# Patient Record
Sex: MALE | Race: WHITE | NOT HISPANIC OR LATINO | Employment: OTHER | URBAN - METROPOLITAN AREA
[De-identification: names, ages, dates, MRNs, and addresses within clinical notes are randomized per-mention and may not be internally consistent; named-entity substitution may affect disease eponyms.]

---

## 2019-01-24 PROBLEM — E78.00 PURE HYPERCHOLESTEROLEMIA: Status: ACTIVE | Noted: 2019-01-24

## 2019-01-24 PROBLEM — Z72.0 TOBACCO ABUSE: Status: ACTIVE | Noted: 2019-01-24

## 2019-01-24 PROBLEM — I10 ESSENTIAL HYPERTENSION: Status: ACTIVE | Noted: 2019-01-24

## 2019-06-11 ENCOUNTER — PATIENT OUTREACH (OUTPATIENT)
Dept: ADMINISTRATIVE | Facility: HOSPITAL | Age: 59
End: 2019-06-11

## 2019-07-09 PROBLEM — Z86.010 HISTORY OF COLON POLYPS: Status: ACTIVE | Noted: 2019-07-09

## 2019-07-09 PROBLEM — Z86.0100 HISTORY OF COLON POLYPS: Status: ACTIVE | Noted: 2019-07-09

## 2020-01-28 PROBLEM — E11.9 CONTROLLED TYPE 2 DIABETES MELLITUS WITHOUT COMPLICATION, WITHOUT LONG-TERM CURRENT USE OF INSULIN: Status: ACTIVE | Noted: 2020-01-28

## 2020-07-16 ENCOUNTER — PATIENT OUTREACH (OUTPATIENT)
Dept: ADMINISTRATIVE | Facility: HOSPITAL | Age: 60
End: 2020-07-16

## 2020-07-16 ENCOUNTER — TELEPHONE (OUTPATIENT)
Dept: ADMINISTRATIVE | Facility: HOSPITAL | Age: 60
End: 2020-07-16

## 2021-07-29 PROBLEM — I25.10 CORONARY ARTERY CALCIFICATION: Status: ACTIVE | Noted: 2021-07-29

## 2021-07-29 PROBLEM — I25.84 CORONARY ARTERY CALCIFICATION: Status: ACTIVE | Noted: 2021-07-29

## 2021-07-29 PROBLEM — E78.2 MIXED HYPERLIPIDEMIA: Status: ACTIVE | Noted: 2021-07-29

## 2021-07-29 PROBLEM — I71.40 ABDOMINAL AORTIC ANEURYSM (AAA) WITHOUT RUPTURE: Status: ACTIVE | Noted: 2021-07-29

## 2022-06-10 ENCOUNTER — PATIENT OUTREACH (OUTPATIENT)
Dept: ADMINISTRATIVE | Facility: HOSPITAL | Age: 62
End: 2022-06-10

## 2022-06-10 NOTE — PROGRESS NOTES
Attempted to contact pt in reference to [facility] gap report. Unable to contact. No answer message left.

## 2022-07-23 PROBLEM — I21.4 NSTEMI (NON-ST ELEVATED MYOCARDIAL INFARCTION): Status: ACTIVE | Noted: 2022-07-23

## 2022-07-26 RX ORDER — DICLOFENAC SODIUM 10 MG/G
2 GEL TOPICAL 4 TIMES DAILY
COMMUNITY
Start: 2022-06-27 | End: 2022-08-12 | Stop reason: ALTCHOICE

## 2022-07-27 ENCOUNTER — TELEPHONE (OUTPATIENT)
Dept: CARDIAC REHAB | Facility: CLINIC | Age: 62
End: 2022-07-27
Payer: MEDICAID

## 2022-07-27 ENCOUNTER — HOSPITAL ENCOUNTER (INPATIENT)
Facility: HOSPITAL | Age: 62
LOS: 1 days | Discharge: HOME | DRG: 247 | End: 2022-07-28
Attending: HOSPITALIST | Admitting: HOSPITALIST
Payer: MEDICAID

## 2022-07-27 DIAGNOSIS — E11.9 CONTROLLED TYPE 2 DIABETES MELLITUS WITHOUT COMPLICATION, WITHOUT LONG-TERM CURRENT USE OF INSULIN: ICD-10-CM

## 2022-07-27 DIAGNOSIS — E78.2 MIXED HYPERLIPIDEMIA: ICD-10-CM

## 2022-07-27 DIAGNOSIS — I21.4 ACUTE MYOCARDIAL INFARCTION, SUBENDOCARDIAL INFARCTION, INITIAL EPISODE OF CARE: Primary | ICD-10-CM

## 2022-07-27 DIAGNOSIS — R07.9 CHEST PAIN: ICD-10-CM

## 2022-07-27 DIAGNOSIS — I21.4 NSTEMI (NON-ST ELEVATED MYOCARDIAL INFARCTION): Primary | ICD-10-CM

## 2022-07-27 DIAGNOSIS — Z95.5 STATUS POST CORONARY ARTERY STENT PLACEMENT: ICD-10-CM

## 2022-07-27 DIAGNOSIS — R07.9 CHEST PAIN AT REST: ICD-10-CM

## 2022-07-27 DIAGNOSIS — Z72.0 TOBACCO ABUSE: ICD-10-CM

## 2022-07-27 DIAGNOSIS — Z98.62 STATUS POST ANGIOPLASTY: ICD-10-CM

## 2022-07-27 DIAGNOSIS — I25.10 CORONARY ARTERY CALCIFICATION: ICD-10-CM

## 2022-07-27 DIAGNOSIS — I71.40 ABDOMINAL AORTIC ANEURYSM (AAA) WITHOUT RUPTURE: ICD-10-CM

## 2022-07-27 DIAGNOSIS — I25.84 CORONARY ARTERY CALCIFICATION: ICD-10-CM

## 2022-07-27 LAB
ALBUMIN SERPL BCP-MCNC: 3.8 G/DL (ref 3.5–5.2)
ALP SERPL-CCNC: 96 U/L (ref 55–135)
ALT SERPL W/O P-5'-P-CCNC: 25 U/L (ref 10–44)
ANION GAP SERPL CALC-SCNC: 10 MMOL/L (ref 8–16)
APTT BLDCRRT: 29.3 SEC (ref 21–32)
APTT BLDCRRT: 31.7 SEC (ref 21–32)
APTT BLDCRRT: 66.9 SEC (ref 21–32)
AST SERPL-CCNC: 21 U/L (ref 10–40)
BASOPHILS # BLD AUTO: 0.04 K/UL (ref 0–0.2)
BASOPHILS # BLD AUTO: 0.05 K/UL (ref 0–0.2)
BASOPHILS NFR BLD: 0.7 % (ref 0–1.9)
BASOPHILS NFR BLD: 0.9 % (ref 0–1.9)
BILIRUB SERPL-MCNC: 0.4 MG/DL (ref 0.1–1)
BUN SERPL-MCNC: 14 MG/DL (ref 6–20)
CALCIUM SERPL-MCNC: 9.9 MG/DL (ref 8.7–10.5)
CHLORIDE SERPL-SCNC: 105 MMOL/L (ref 95–110)
CO2 SERPL-SCNC: 23 MMOL/L (ref 23–29)
CREAT SERPL-MCNC: 0.8 MG/DL (ref 0.5–1.4)
DIFFERENTIAL METHOD: ABNORMAL
DIFFERENTIAL METHOD: ABNORMAL
EOSINOPHIL # BLD AUTO: 0.1 K/UL (ref 0–0.5)
EOSINOPHIL # BLD AUTO: 0.1 K/UL (ref 0–0.5)
EOSINOPHIL NFR BLD: 1 % (ref 0–8)
EOSINOPHIL NFR BLD: 1.2 % (ref 0–8)
ERYTHROCYTE [DISTWIDTH] IN BLOOD BY AUTOMATED COUNT: 14.7 % (ref 11.5–14.5)
ERYTHROCYTE [DISTWIDTH] IN BLOOD BY AUTOMATED COUNT: 14.7 % (ref 11.5–14.5)
EST. GFR  (AFRICAN AMERICAN): >60 ML/MIN/1.73 M^2
EST. GFR  (NON AFRICAN AMERICAN): >60 ML/MIN/1.73 M^2
ESTIMATED AVG GLUCOSE: 131 MG/DL (ref 68–131)
GLUCOSE SERPL-MCNC: 92 MG/DL (ref 70–110)
HBA1C MFR BLD: 6.2 % (ref 4–5.6)
HCT VFR BLD AUTO: 50.4 % (ref 40–54)
HCT VFR BLD AUTO: 51 % (ref 40–54)
HGB BLD-MCNC: 16.5 G/DL (ref 14–18)
HGB BLD-MCNC: 17 G/DL (ref 14–18)
IMM GRANULOCYTES # BLD AUTO: 0.03 K/UL (ref 0–0.04)
IMM GRANULOCYTES # BLD AUTO: 0.03 K/UL (ref 0–0.04)
IMM GRANULOCYTES NFR BLD AUTO: 0.5 % (ref 0–0.5)
IMM GRANULOCYTES NFR BLD AUTO: 0.5 % (ref 0–0.5)
INR PPP: 1.1 (ref 0.8–1.2)
LYMPHOCYTES # BLD AUTO: 1.4 K/UL (ref 1–4.8)
LYMPHOCYTES # BLD AUTO: 1.4 K/UL (ref 1–4.8)
LYMPHOCYTES NFR BLD: 23.5 % (ref 18–48)
LYMPHOCYTES NFR BLD: 23.9 % (ref 18–48)
MAGNESIUM SERPL-MCNC: 2 MG/DL (ref 1.6–2.6)
MCH RBC QN AUTO: 29.7 PG (ref 27–31)
MCH RBC QN AUTO: 30.2 PG (ref 27–31)
MCHC RBC AUTO-ENTMCNC: 32.4 G/DL (ref 32–36)
MCHC RBC AUTO-ENTMCNC: 33.7 G/DL (ref 32–36)
MCV RBC AUTO: 90 FL (ref 82–98)
MCV RBC AUTO: 92 FL (ref 82–98)
MONOCYTES # BLD AUTO: 0.7 K/UL (ref 0.3–1)
MONOCYTES # BLD AUTO: 0.7 K/UL (ref 0.3–1)
MONOCYTES NFR BLD: 11.3 % (ref 4–15)
MONOCYTES NFR BLD: 12.4 % (ref 4–15)
NEUTROPHILS # BLD AUTO: 3.6 K/UL (ref 1.8–7.7)
NEUTROPHILS # BLD AUTO: 3.6 K/UL (ref 1.8–7.7)
NEUTROPHILS NFR BLD: 61.5 % (ref 38–73)
NEUTROPHILS NFR BLD: 62.6 % (ref 38–73)
NRBC BLD-RTO: 0 /100 WBC
NRBC BLD-RTO: 0 /100 WBC
PHOSPHATE SERPL-MCNC: 3.5 MG/DL (ref 2.7–4.5)
PLATELET # BLD AUTO: 211 K/UL (ref 150–450)
PLATELET # BLD AUTO: 214 K/UL (ref 150–450)
PMV BLD AUTO: 10.2 FL (ref 9.2–12.9)
PMV BLD AUTO: 9.9 FL (ref 9.2–12.9)
POC ACTIVATED CLOTTING TIME K: 126 SEC (ref 74–137)
POC ACTIVATED CLOTTING TIME K: 179 SEC (ref 74–137)
POC ACTIVATED CLOTTING TIME K: 213 SEC (ref 74–137)
POC ACTIVATED CLOTTING TIME K: 213 SEC (ref 74–137)
POC ACTIVATED CLOTTING TIME K: 242 SEC (ref 74–137)
POC ACTIVATED CLOTTING TIME K: 265 SEC (ref 74–137)
POC ACTIVATED CLOTTING TIME K: 289 SEC (ref 74–137)
POC ACTIVATED CLOTTING TIME K: 335 SEC (ref 74–137)
POCT GLUCOSE: 96 MG/DL (ref 70–110)
POCT GLUCOSE: 98 MG/DL (ref 70–110)
POTASSIUM SERPL-SCNC: 4.1 MMOL/L (ref 3.5–5.1)
PROT SERPL-MCNC: 7.9 G/DL (ref 6–8.4)
PROTHROMBIN TIME: 11.1 SEC (ref 9–12.5)
RBC # BLD AUTO: 5.55 M/UL (ref 4.6–6.2)
RBC # BLD AUTO: 5.63 M/UL (ref 4.6–6.2)
SAMPLE: ABNORMAL
SAMPLE: NORMAL
SODIUM SERPL-SCNC: 138 MMOL/L (ref 136–145)
WBC # BLD AUTO: 5.79 K/UL (ref 3.9–12.7)
WBC # BLD AUTO: 5.82 K/UL (ref 3.9–12.7)

## 2022-07-27 PROCEDURE — A4216 STERILE WATER/SALINE, 10 ML: HCPCS | Performed by: NURSE PRACTITIONER

## 2022-07-27 PROCEDURE — 25500020 PHARM REV CODE 255: Performed by: INTERNAL MEDICINE

## 2022-07-27 PROCEDURE — C1874 STENT, COATED/COV W/DEL SYS: HCPCS | Performed by: INTERNAL MEDICINE

## 2022-07-27 PROCEDURE — 85025 COMPLETE CBC W/AUTO DIFF WBC: CPT | Performed by: NURSE PRACTITIONER

## 2022-07-27 PROCEDURE — 25000003 PHARM REV CODE 250: Performed by: NURSE PRACTITIONER

## 2022-07-27 PROCEDURE — 93010 ELECTROCARDIOGRAM REPORT: CPT | Mod: ,,, | Performed by: INTERNAL MEDICINE

## 2022-07-27 PROCEDURE — 27201423 OPTIME MED/SURG SUP & DEVICES STERILE SUPPLY: Performed by: INTERNAL MEDICINE

## 2022-07-27 PROCEDURE — 27000221 HC OXYGEN, UP TO 24 HOURS

## 2022-07-27 PROCEDURE — 99900035 HC TECH TIME PER 15 MIN (STAT)

## 2022-07-27 PROCEDURE — 99223 1ST HOSP IP/OBS HIGH 75: CPT | Mod: 25,,, | Performed by: NURSE PRACTITIONER

## 2022-07-27 PROCEDURE — 92920 PR PTCA: ICD-10-PCS | Mod: LC,59,, | Performed by: INTERNAL MEDICINE

## 2022-07-27 PROCEDURE — 85730 THROMBOPLASTIN TIME PARTIAL: CPT | Performed by: HOSPITALIST

## 2022-07-27 PROCEDURE — 92920 PRQ TRLUML C ANGIOP 1ART&/BR: CPT | Mod: LC,59,, | Performed by: INTERNAL MEDICINE

## 2022-07-27 PROCEDURE — C1753 CATH, INTRAVAS ULTRASOUND: HCPCS | Performed by: INTERNAL MEDICINE

## 2022-07-27 PROCEDURE — 93454 CORONARY ARTERY ANGIO S&I: CPT | Mod: 59 | Performed by: INTERNAL MEDICINE

## 2022-07-27 PROCEDURE — 93458 PR CATH PLACE/CORON ANGIO, IMG SUPER/INTERP,W LEFT HEART VENTRICULOGRAPHY: ICD-10-PCS | Mod: 26,59,51, | Performed by: INTERNAL MEDICINE

## 2022-07-27 PROCEDURE — 99223 PR INITIAL HOSPITAL CARE,LEVL III: ICD-10-PCS | Mod: 25,,, | Performed by: NURSE PRACTITIONER

## 2022-07-27 PROCEDURE — 92978 PR IVUS, CORONARY, 1ST VESSEL: ICD-10-PCS | Mod: 26,LC,, | Performed by: INTERNAL MEDICINE

## 2022-07-27 PROCEDURE — 80053 COMPREHEN METABOLIC PANEL: CPT | Performed by: NURSE PRACTITIONER

## 2022-07-27 PROCEDURE — 93010 ELECTROCARDIOGRAM REPORT: CPT | Mod: 59,76,, | Performed by: INTERNAL MEDICINE

## 2022-07-27 PROCEDURE — 92978 ENDOLUMINL IVUS OCT C 1ST: CPT | Mod: 26,LC,, | Performed by: INTERNAL MEDICINE

## 2022-07-27 PROCEDURE — 36415 COLL VENOUS BLD VENIPUNCTURE: CPT | Performed by: HOSPITALIST

## 2022-07-27 PROCEDURE — 99152 MOD SED SAME PHYS/QHP 5/>YRS: CPT | Performed by: INTERNAL MEDICINE

## 2022-07-27 PROCEDURE — 20000000 HC ICU ROOM

## 2022-07-27 PROCEDURE — 25000242 PHARM REV CODE 250 ALT 637 W/ HCPCS: Performed by: INTERNAL MEDICINE

## 2022-07-27 PROCEDURE — 83036 HEMOGLOBIN GLYCOSYLATED A1C: CPT | Performed by: NURSE PRACTITIONER

## 2022-07-27 PROCEDURE — 99152 MOD SED SAME PHYS/QHP 5/>YRS: CPT | Mod: ,,, | Performed by: INTERNAL MEDICINE

## 2022-07-27 PROCEDURE — 93458 L HRT ARTERY/VENTRICLE ANGIO: CPT | Mod: 59 | Performed by: INTERNAL MEDICINE

## 2022-07-27 PROCEDURE — 85610 PROTHROMBIN TIME: CPT | Performed by: NURSE PRACTITIONER

## 2022-07-27 PROCEDURE — C1894 INTRO/SHEATH, NON-LASER: HCPCS | Performed by: INTERNAL MEDICINE

## 2022-07-27 PROCEDURE — 83735 ASSAY OF MAGNESIUM: CPT | Performed by: NURSE PRACTITIONER

## 2022-07-27 PROCEDURE — 99152 PR MOD CONSCIOUS SEDATION, SAME PHYS, 5+ YRS, FIRST 15 MIN: ICD-10-PCS | Mod: ,,, | Performed by: INTERNAL MEDICINE

## 2022-07-27 PROCEDURE — 93010 EKG 12-LEAD: ICD-10-PCS | Mod: ,,, | Performed by: INTERNAL MEDICINE

## 2022-07-27 PROCEDURE — 99153 MOD SED SAME PHYS/QHP EA: CPT | Performed by: INTERNAL MEDICINE

## 2022-07-27 PROCEDURE — 85347 COAGULATION TIME ACTIVATED: CPT | Performed by: INTERNAL MEDICINE

## 2022-07-27 PROCEDURE — 93005 ELECTROCARDIOGRAM TRACING: CPT

## 2022-07-27 PROCEDURE — 63600175 PHARM REV CODE 636 W HCPCS: Performed by: NURSE PRACTITIONER

## 2022-07-27 PROCEDURE — 36415 COLL VENOUS BLD VENIPUNCTURE: CPT | Performed by: NURSE PRACTITIONER

## 2022-07-27 PROCEDURE — C1887 CATHETER, GUIDING: HCPCS | Performed by: INTERNAL MEDICINE

## 2022-07-27 PROCEDURE — 92928 PRQ TCAT PLMT NTRAC ST 1 LES: CPT | Mod: LC,,, | Performed by: INTERNAL MEDICINE

## 2022-07-27 PROCEDURE — 94761 N-INVAS EAR/PLS OXIMETRY MLT: CPT

## 2022-07-27 PROCEDURE — 92920 PRQ TRLUML C ANGIOP 1ART&/BR: CPT | Mod: LC | Performed by: INTERNAL MEDICINE

## 2022-07-27 PROCEDURE — 85730 THROMBOPLASTIN TIME PARTIAL: CPT | Mod: 91 | Performed by: NURSE PRACTITIONER

## 2022-07-27 PROCEDURE — C9600 PERC DRUG-EL COR STENT SING: HCPCS | Mod: LC | Performed by: INTERNAL MEDICINE

## 2022-07-27 PROCEDURE — 84100 ASSAY OF PHOSPHORUS: CPT | Performed by: NURSE PRACTITIONER

## 2022-07-27 PROCEDURE — C1769 GUIDE WIRE: HCPCS | Performed by: INTERNAL MEDICINE

## 2022-07-27 PROCEDURE — C1725 CATH, TRANSLUMIN NON-LASER: HCPCS | Performed by: INTERNAL MEDICINE

## 2022-07-27 PROCEDURE — 63600175 PHARM REV CODE 636 W HCPCS: Performed by: INTERNAL MEDICINE

## 2022-07-27 PROCEDURE — 92928 PR STENT: ICD-10-PCS | Mod: LC,,, | Performed by: INTERNAL MEDICINE

## 2022-07-27 PROCEDURE — 92978 ENDOLUMINL IVUS OCT C 1ST: CPT | Mod: LC,59 | Performed by: INTERNAL MEDICINE

## 2022-07-27 PROCEDURE — 25000003 PHARM REV CODE 250: Performed by: INTERNAL MEDICINE

## 2022-07-27 PROCEDURE — 93458 L HRT ARTERY/VENTRICLE ANGIO: CPT | Mod: 26,59,51, | Performed by: INTERNAL MEDICINE

## 2022-07-27 DEVICE — STENT RONYX25012UX RESOLUTE ONYX 2.50X12
Type: IMPLANTABLE DEVICE | Site: HEART | Status: FUNCTIONAL
Brand: RESOLUTE ONYX™

## 2022-07-27 DEVICE — STENT RONYX25018UX RESOLUTE ONYX 2.50X18
Type: IMPLANTABLE DEVICE | Site: HEART | Status: FUNCTIONAL
Brand: RESOLUTE ONYX™

## 2022-07-27 DEVICE — STENT RONYX30026UX RESOLUTE ONYX 3.00X26
Type: IMPLANTABLE DEVICE | Site: HEART | Status: FUNCTIONAL
Brand: RESOLUTE ONYX™

## 2022-07-27 RX ORDER — NITROGLYCERIN 20 MG/100ML
0-200 INJECTION INTRAVENOUS CONTINUOUS
Status: DISCONTINUED | OUTPATIENT
Start: 2022-07-27 | End: 2022-07-28

## 2022-07-27 RX ORDER — METOPROLOL SUCCINATE 50 MG/1
50 TABLET, EXTENDED RELEASE ORAL DAILY
Status: DISCONTINUED | OUTPATIENT
Start: 2022-07-27 | End: 2022-07-28 | Stop reason: HOSPADM

## 2022-07-27 RX ORDER — NITROGLYCERIN 0.4 MG/1
0.4 TABLET SUBLINGUAL ONCE
Status: COMPLETED | OUTPATIENT
Start: 2022-07-27 | End: 2022-07-27

## 2022-07-27 RX ORDER — SODIUM CHLORIDE 0.9 % (FLUSH) 0.9 %
10 SYRINGE (ML) INJECTION
Status: DISCONTINUED | OUTPATIENT
Start: 2022-07-27 | End: 2022-07-28 | Stop reason: HOSPADM

## 2022-07-27 RX ORDER — IBUPROFEN 200 MG
24 TABLET ORAL
Status: DISCONTINUED | OUTPATIENT
Start: 2022-07-27 | End: 2022-07-28 | Stop reason: HOSPADM

## 2022-07-27 RX ORDER — GLUCAGON 1 MG
1 KIT INJECTION
Status: DISCONTINUED | OUTPATIENT
Start: 2022-07-27 | End: 2022-07-28 | Stop reason: HOSPADM

## 2022-07-27 RX ORDER — ATORVASTATIN CALCIUM 40 MG/1
80 TABLET, FILM COATED ORAL DAILY
Status: DISCONTINUED | OUTPATIENT
Start: 2022-07-27 | End: 2022-07-28 | Stop reason: HOSPADM

## 2022-07-27 RX ORDER — INSULIN ASPART 100 [IU]/ML
0-5 INJECTION, SOLUTION INTRAVENOUS; SUBCUTANEOUS EVERY 6 HOURS PRN
Status: DISCONTINUED | OUTPATIENT
Start: 2022-07-27 | End: 2022-07-28 | Stop reason: HOSPADM

## 2022-07-27 RX ORDER — MUPIROCIN 20 MG/G
OINTMENT TOPICAL 2 TIMES DAILY
Status: DISCONTINUED | OUTPATIENT
Start: 2022-07-28 | End: 2022-07-28 | Stop reason: HOSPADM

## 2022-07-27 RX ORDER — ASPIRIN 81 MG/1
81 TABLET ORAL DAILY
Status: DISCONTINUED | OUTPATIENT
Start: 2022-07-27 | End: 2022-07-27

## 2022-07-27 RX ORDER — CLOPIDOGREL BISULFATE 75 MG/1
75 TABLET ORAL DAILY
Status: DISCONTINUED | OUTPATIENT
Start: 2022-07-27 | End: 2022-07-28 | Stop reason: HOSPADM

## 2022-07-27 RX ORDER — FENTANYL CITRATE 50 UG/ML
INJECTION, SOLUTION INTRAMUSCULAR; INTRAVENOUS
Status: DISCONTINUED | OUTPATIENT
Start: 2022-07-27 | End: 2022-07-27 | Stop reason: HOSPADM

## 2022-07-27 RX ORDER — SODIUM CHLORIDE 0.9 % (FLUSH) 0.9 %
10 SYRINGE (ML) INJECTION EVERY 8 HOURS
Status: DISCONTINUED | OUTPATIENT
Start: 2022-07-27 | End: 2022-07-28 | Stop reason: HOSPADM

## 2022-07-27 RX ORDER — GLUCAGON 1 MG
1 KIT INJECTION
Status: DISCONTINUED | OUTPATIENT
Start: 2022-07-27 | End: 2022-07-27 | Stop reason: SDUPTHER

## 2022-07-27 RX ORDER — IODIXANOL 320 MG/ML
INJECTION, SOLUTION INTRAVASCULAR
Status: DISCONTINUED | OUTPATIENT
Start: 2022-07-27 | End: 2022-07-28 | Stop reason: HOSPADM

## 2022-07-27 RX ORDER — ONDANSETRON 2 MG/ML
4 INJECTION INTRAMUSCULAR; INTRAVENOUS EVERY 8 HOURS PRN
Status: DISCONTINUED | OUTPATIENT
Start: 2022-07-27 | End: 2022-07-28 | Stop reason: HOSPADM

## 2022-07-27 RX ORDER — PANTOPRAZOLE SODIUM 40 MG/1
40 TABLET, DELAYED RELEASE ORAL DAILY
Status: DISCONTINUED | OUTPATIENT
Start: 2022-07-27 | End: 2022-07-28 | Stop reason: HOSPADM

## 2022-07-27 RX ORDER — HEPARIN SODIUM,PORCINE/D5W 25000/250
0-40 INTRAVENOUS SOLUTION INTRAVENOUS CONTINUOUS
Status: DISCONTINUED | OUTPATIENT
Start: 2022-07-27 | End: 2022-07-28

## 2022-07-27 RX ORDER — CLOPIDOGREL BISULFATE 75 MG/1
75 TABLET ORAL DAILY
Status: DISCONTINUED | OUTPATIENT
Start: 2022-07-27 | End: 2022-07-27

## 2022-07-27 RX ORDER — IODIXANOL 320 MG/ML
INJECTION, SOLUTION INTRAVASCULAR
Status: DISCONTINUED | OUTPATIENT
Start: 2022-07-27 | End: 2022-07-27 | Stop reason: HOSPADM

## 2022-07-27 RX ORDER — NALOXONE HCL 0.4 MG/ML
0.02 VIAL (ML) INJECTION
Status: DISCONTINUED | OUTPATIENT
Start: 2022-07-27 | End: 2022-07-28 | Stop reason: HOSPADM

## 2022-07-27 RX ORDER — SODIUM CHLORIDE 9 MG/ML
INJECTION, SOLUTION INTRAVENOUS
Status: DISCONTINUED | OUTPATIENT
Start: 2022-07-27 | End: 2022-07-28 | Stop reason: HOSPADM

## 2022-07-27 RX ORDER — HEPARIN SODIUM 1000 [USP'U]/ML
INJECTION, SOLUTION INTRAVENOUS; SUBCUTANEOUS
Status: DISCONTINUED | OUTPATIENT
Start: 2022-07-27 | End: 2022-07-27 | Stop reason: HOSPADM

## 2022-07-27 RX ORDER — IPRATROPIUM BROMIDE AND ALBUTEROL SULFATE 2.5; .5 MG/3ML; MG/3ML
3 SOLUTION RESPIRATORY (INHALATION) EVERY 4 HOURS PRN
Status: DISCONTINUED | OUTPATIENT
Start: 2022-07-27 | End: 2022-07-28 | Stop reason: HOSPADM

## 2022-07-27 RX ORDER — LIDOCAINE HYDROCHLORIDE 10 MG/ML
INJECTION INFILTRATION; PERINEURAL
Status: DISCONTINUED | OUTPATIENT
Start: 2022-07-27 | End: 2022-07-27 | Stop reason: HOSPADM

## 2022-07-27 RX ORDER — OXYCODONE HYDROCHLORIDE 5 MG/1
5 TABLET ORAL EVERY 6 HOURS PRN
Status: DISCONTINUED | OUTPATIENT
Start: 2022-07-27 | End: 2022-07-28 | Stop reason: HOSPADM

## 2022-07-27 RX ORDER — MIDAZOLAM HYDROCHLORIDE 1 MG/ML
INJECTION, SOLUTION INTRAMUSCULAR; INTRAVENOUS
Status: DISCONTINUED | OUTPATIENT
Start: 2022-07-27 | End: 2022-07-27 | Stop reason: HOSPADM

## 2022-07-27 RX ORDER — IBUPROFEN 200 MG
16 TABLET ORAL
Status: DISCONTINUED | OUTPATIENT
Start: 2022-07-27 | End: 2022-07-28 | Stop reason: HOSPADM

## 2022-07-27 RX ORDER — TALC
6 POWDER (GRAM) TOPICAL NIGHTLY PRN
Status: DISCONTINUED | OUTPATIENT
Start: 2022-07-27 | End: 2022-07-28 | Stop reason: HOSPADM

## 2022-07-27 RX ORDER — POLYETHYLENE GLYCOL 3350 17 G/17G
17 POWDER, FOR SOLUTION ORAL DAILY PRN
Status: DISCONTINUED | OUTPATIENT
Start: 2022-07-27 | End: 2022-07-28 | Stop reason: HOSPADM

## 2022-07-27 RX ORDER — HEPARIN SODIUM 200 [USP'U]/100ML
INJECTION, SOLUTION INTRAVENOUS
Status: DISCONTINUED | OUTPATIENT
Start: 2022-07-27 | End: 2022-07-28 | Stop reason: HOSPADM

## 2022-07-27 RX ORDER — ASPIRIN 81 MG/1
81 TABLET ORAL DAILY
Status: DISCONTINUED | OUTPATIENT
Start: 2022-07-27 | End: 2022-07-28 | Stop reason: HOSPADM

## 2022-07-27 RX ORDER — ACETAMINOPHEN 325 MG/1
650 TABLET ORAL EVERY 8 HOURS PRN
Status: DISCONTINUED | OUTPATIENT
Start: 2022-07-27 | End: 2022-07-28 | Stop reason: HOSPADM

## 2022-07-27 RX ADMIN — Medication 10 ML: at 05:07

## 2022-07-27 RX ADMIN — PANTOPRAZOLE SODIUM 40 MG: 40 TABLET, DELAYED RELEASE ORAL at 08:07

## 2022-07-27 RX ADMIN — METOPROLOL SUCCINATE 50 MG: 50 TABLET, EXTENDED RELEASE ORAL at 08:07

## 2022-07-27 RX ADMIN — CLOPIDOGREL 75 MG: 75 TABLET, FILM COATED ORAL at 08:07

## 2022-07-27 RX ADMIN — ASPIRIN 81 MG: 81 TABLET, COATED ORAL at 08:07

## 2022-07-27 RX ADMIN — HEPARIN SODIUM 15 UNITS/KG/HR: 10000 INJECTION, SOLUTION INTRAVENOUS at 10:07

## 2022-07-27 RX ADMIN — HEPARIN SODIUM 12 UNITS/KG/HR: 10000 INJECTION, SOLUTION INTRAVENOUS at 09:07

## 2022-07-27 RX ADMIN — NITROGLYCERIN 30 MCG/MIN: 20 INJECTION INTRAVENOUS at 12:07

## 2022-07-27 RX ADMIN — Medication 10 ML: at 10:07

## 2022-07-27 RX ADMIN — NITROGLYCERIN 0.4 MG: 0.4 TABLET, ORALLY DISINTEGRATING SUBLINGUAL at 12:07

## 2022-07-27 RX ADMIN — ATORVASTATIN CALCIUM 80 MG: 40 TABLET, FILM COATED ORAL at 08:07

## 2022-07-27 NOTE — CONSULTS
[facility] - Telemetry  Cardiology  Consult Note    Patient Name: Adrian Benton  MRN: [id number]  Admission Date: 7/27/2022  Hospital Length of Stay: 0 days  Code Status: Full Code   Attending Provider: Henok Delgado, *   Consulting Provider: Guy Holt NP  Primary Care Physician: Darion Merino II, NP  Principal Problem:NSTEMI (non-ST elevated myocardial infarction)    Patient information was obtained from patient, past medical records and ER records.     Cardiology-[facility]  Consult performed by: Guy Holt NP  Consult ordered by: Dina Escamilla NP        Subjective:     Chief Complaint:  NSTEMI     HPI:   Adrian Benton is a 61 y.o. male with HTN, tobacco abuse, AAA, DM who presented to outside facility with 2 day history of intermittent chest pain and SOB. Found to have NSTEMI. Patient was transferred to [facility] for interventional cardiology. He is presently CP free on DAPT, statin, BB, and heparin gtt. NPO for [facility] today. HR and BP are controlled.      Recent Diagnostics:     Echo:   1. Left ventricle: Normal in size with normal systolic function. LV ejection fraction is 55-60%. No regional wall motion abnormalities are evident. Normal LV geometry.  2. Left ventricular diastolic function: Normal.  3. Right ventricle: Normal in size with normal systolic function.   4. Estimated PASP: <40 mmHg normal Estimated RAP: 3 mmHg based on IVC assessment.  5. Left atrium: Normal in size.  Right atrium: Normal in size  6. Valves: No clinically significant abnormalities detected.  7. Aorta: Normal caliber when indexed for BSA  8. Pericardium: Normal thickness No effusion present.  9. Technical quality is adequate. challenging due to acoustic windows. Echo contrast was utilized to improve endocardial visualization.  10. No prior studies available for comparison        [facility] 07/25/2022     · There is two vessel coronary artery disease.  · The Mid Cx to Dist Cx lesion was approximately 95%  stenosed.  · The distal RPDA lesion was approximately 99% stenosed.  · Nonobstructive coronary disease present elsewhere.  · Left ventricular end diastolic pressure was 12 mmHg.  · Recommend transfer to PCI capable facility for consultation with Interventional Cardiology.      Past Medical History:   Diagnosis Date    Diabetes     Diabetes mellitus, type 2     GERD (gastroesophageal reflux disease)     Hypercholesteremia     Hypertension        Past Surgical History:   Procedure Laterality Date    COLONOSCOPY N/A 7/9/2019    Procedure: COLONOSCOPY;  Surgeon: Luis Bogran-Reyes, MD;  Location: [facility];  Service: Endoscopy;  Laterality: N/A;    CORONARY ANGIOGRAPHY Right 7/25/2022    Procedure: ANGIOGRAM, CORONARY ARTERY;  Surgeon: Tawnya Pruett MD;  Location: [facility] CATH LAB;  Service: Cardiology;  Laterality: Right;    LEFT HEART CATHETERIZATION Left 7/25/2022    Procedure: Left heart cath;  Surgeon: Tawnya Pruett MD;  Location: [facility] CATH LAB;  Service: Cardiology;  Laterality: Left;       Review of patient's allergies indicates:  No Known Allergies    Current Facility-Administered Medications on File Prior to Encounter   Medication    [DISCONTINUED] acetaminophen tablet 650 mg    [DISCONTINUED] acetaminophen tablet 650 mg    [DISCONTINUED] aluminum-magnesium hydroxide-simethicone 200-200-20 mg/5 mL suspension 30 mL    [DISCONTINUED] aspirin chewable tablet 81 mg    [DISCONTINUED] atorvastatin tablet 80 mg    [DISCONTINUED] clopidogreL tablet 75 mg    [DISCONTINUED] dextrose 10% bolus 125 mL    [DISCONTINUED] dextrose 10% bolus 125 mL    [DISCONTINUED] dextrose 10% bolus 250 mL    [DISCONTINUED] dextrose 10% bolus 250 mL    [DISCONTINUED] glucagon (human recombinant) injection 1 mg    [DISCONTINUED] glucose chewable tablet 16 g    [DISCONTINUED] glucose chewable tablet 24 g    [DISCONTINUED] heparin 25,000 units in dextrose 5% (100 units/ml) IV bolus from bag - ADDITIONAL  PRN BOLUS - 30 units/kg (max bolus 4000 units)    [DISCONTINUED] heparin 25,000 units in dextrose 5% (100 units/ml) IV bolus from bag - ADDITIONAL PRN BOLUS - 60 units/kg (max bolus 4000 units)    [DISCONTINUED] heparin 25,000 units in dextrose 5% 250 mL (100 units/mL) infusion LOW INTENSITY nomogram - [facility]    [DISCONTINUED] insulin aspart U-100 pen 0-5 Units    [DISCONTINUED] melatonin tablet 6 mg    [DISCONTINUED] metoprolol succinate (TOPROL-XL) 24 hr tablet 50 mg    [DISCONTINUED] nitroGLYCERIN SL tablet 0.4 mg    [DISCONTINUED] ondansetron disintegrating tablet 8 mg    [DISCONTINUED] oxyCODONE immediate release tablet 5 mg    [DISCONTINUED] pantoprazole EC tablet 40 mg    [DISCONTINUED] sodium chloride 0.9% flush 10 mL     Current Outpatient Medications on File Prior to Encounter   Medication Sig    aspirin (ECOTRIN) 81 MG EC tablet Take 81 mg by mouth once daily.    diclofenac sodium (VOLTAREN) 1 % Gel Apply 2 g topically 4 (four) times daily.    ezetimibe (ZETIA) 10 mg tablet Take 1 tablet (10 mg total) by mouth once daily.    isosorbide mononitrate (IMDUR) 30 MG 24 hr tablet Take 1 tablet (30 mg total) by mouth once daily.    LIDOcaine (LIDODERM) 5 % Place 1 patch onto the skin once daily. Remove & Discard patch within 12 hours or as directed by MD    metFORMIN (GLUCOPHAGE) 500 MG tablet TAKE 1 TABLET(500 MG) BY MOUTH TWICE DAILY WITH MEALS    metoprolol succinate (TOPROL-XL) 50 MG 24 hr tablet Take 1 tablet (50 mg total) by mouth once daily.    omeprazole (PRILOSEC) 40 MG capsule Take 1 capsule (40 mg total) by mouth once daily.    rosuvastatin (CRESTOR) 40 MG Tab Take 1 tablet (40 mg total) by mouth every evening.    vitamin D (VITAMIN D3) 1000 units Tab Take 1,000 Units by mouth once daily.     Family History       Problem Relation (Age of Onset)    Cancer Mother, Father    Heart disease Father, Paternal Grandmother    Hypertension Mother          Tobacco Use    Smoking status: Current  Every Day Smoker     Packs/day: 1.00     Years: 39.00     Pack years: 39.00     Types: Cigarettes    Smokeless tobacco: Never Used   Substance and Sexual Activity    Alcohol use: Yes     Comment: occ    Drug use: No    Sexual activity: Yes     Partners: Female     Review of Systems   Constitutional: Negative for diaphoresis.   HENT: Negative.     Eyes: Negative.    Cardiovascular:  Positive for chest pain. Negative for leg swelling, near-syncope, orthopnea, palpitations, paroxysmal nocturnal dyspnea and syncope.   Respiratory:  Positive for shortness of breath. Negative for cough.    Endocrine: Negative.    Hematologic/Lymphatic: Negative.    Skin: Negative.    Musculoskeletal: Negative.    Gastrointestinal:  Negative for nausea and vomiting.   Genitourinary: Negative.    Neurological: Negative.    Psychiatric/Behavioral: Negative.     Allergic/Immunologic: Negative.    Objective:     Vital Signs (Most Recent):  Temp: 97.6 °F (36.4 °C) (07/27/22 0738)  Pulse: 60 (07/27/22 0738)  Resp: 16 (07/27/22 0738)  BP: 128/80 (07/27/22 0738)  SpO2: 95 % (07/27/22 0857) Vital Signs (24h Range):  Temp:  [97.5 °F (36.4 °C)-98.7 °F (37.1 °C)] 97.6 °F (36.4 °C)  Pulse:  [58-71] 60  Resp:  [16-20] 16  SpO2:  [93 %-97 %] 95 %  BP: (114-139)/(76-92) 128/80     Weight: 93.5 kg (206 lb 2.1 oz)  Body mass index is 29.58 kg/m².    SpO2: 95 %  O2 Device (Oxygen Therapy): room air      Intake/Output Summary (Last 24 hours) at 7/27/2022 0954  Last data filed at 7/27/2022 0300  Gross per 24 hour   Intake 0 ml   Output --   Net 0 ml       Lines/Drains/Airways       Peripheral Intravenous Line  Duration                  Peripheral IV - Single Lumen 07/23/22 1924 20 G Left Antecubital 3 days         Peripheral IV - Single Lumen 07/24/22 0038 20 G Left Hand 3 days                    Physical Exam  Constitutional:       General: He is not in acute distress.     Appearance: He is obese. He is not diaphoretic.   HENT:      Head: Atraumatic.    Eyes:      General:         Right eye: No discharge.         Left eye: No discharge.   Cardiovascular:      Rate and Rhythm: Normal rate and regular rhythm.      Heart sounds: No murmur heard.    No friction rub. No gallop.   Pulmonary:      Effort: Pulmonary effort is normal.      Breath sounds: Normal breath sounds.   Abdominal:      General: Bowel sounds are normal.      Palpations: Abdomen is soft.   Musculoskeletal:      Right lower leg: No edema.      Left lower leg: No edema.   Skin:     General: Skin is warm and dry.      Capillary Refill: Capillary refill takes 2 to 3 seconds.   Neurological:      Mental Status: He is alert and oriented to person, place, and time. Mental status is at baseline.   Psychiatric:         Mood and Affect: Mood normal.         Behavior: Behavior normal.         Thought Content: Thought content normal.         Judgment: Judgment normal.       Significant Labs: BMP:   Recent Labs   Lab 07/26/22 0240 07/27/22 0622   GLU 90 92    138   K 4.0 4.1    105   CO2 24 23   BUN 14 14   CREATININE 0.9 0.8   CALCIUM 9.8 9.9   MG 1.9 2.0   , CMP   Recent Labs   Lab 07/26/22 0240 07/27/22 0622    138   K 4.0 4.1    105   CO2 24 23   GLU 90 92   BUN 14 14   CREATININE 0.9 0.8   CALCIUM 9.8 9.9   PROT 7.8 7.9   ALBUMIN 3.9 3.8   BILITOT 0.6 0.4   ALKPHOS 99 96   AST 17 21   ALT 21 25   ANIONGAP 11 10   ESTGFRAFRICA >60.0 >60   EGFRNONAA >60.0 >60   , CBC   Recent Labs   Lab 07/26/22 0240 07/27/22 0622 07/27/22 0721   WBC 10.82 5.82 5.79   HGB 16.3 17.0 16.5   HCT 50.1 50.4 51.0    211 214   , INR   Recent Labs   Lab 07/27/22 0721   INR 1.1   , Lipid Panel No results for input(s): CHOL, HDL, LDLCALC, TRIG, CHOLHDL in the last 48 hours., Troponin No results for input(s): TROPONINI in the last 48 hours., and All pertinent lab results from the last 24 hours have been reviewed.    Significant Imaging: Echocardiogram: Transthoracic echo (TTE) complete ([facility]  Only):   Results for orders placed or performed during the hospital encounter of 07/23/22   Echo   Result Value Ref Range    BSA 2.16 m2    TDI SEPTAL 0.06 m/s    LV LATERAL E/E' RATIO 7.67 m/s    LV SEPTAL E/E' RATIO 15.33 m/s    LA WIDTH 4.00 cm    Right Atrial Pressure (from IVC) 3 mmHg    IVC diameter 1.35 cm    RV mid diameter 3.00 cm    QEF 63 %    EF 60 %    Left Ventricular Outflow Tract Mean Velocity 0.6315145313 cm/s    Left Ventricular Outflow Tract Mean Gradient 2.34 mmHg    TDI LATERAL 0.12 m/s    LVIDd 4.71 3.5 - 6.0 cm    IVS 0.79 0.6 - 1.1 cm    Posterior Wall 0.64 0.6 - 1.1 cm    LVIDs 4.24 (A) 2.1 - 4.0 cm    FS 10 28 - 44 %    LA volume 70.21 cm3    STJ 2.97 cm    Ascending aorta 3.06 cm    LV mass 106.25 g    LA size 4.07 cm    RVDD 3.43 cm    TAPSE 1.80 cm    Right ventricular length in diastole (apical 4-chamber view) 6.50 cm    RV S' 11 cm/s    Left Ventricle Relative Wall Thickness 0.27 cm    AV mean gradient 5 mmHg    AV valve area 2.72 cm2    AV Velocity Ratio 0.75     AV index (prosthetic) 0.67     E/A ratio 1.44     Mean e' 0.09 m/s    E wave deceleration time [id number] msec    Pulm vein S/D ratio 2.15     LVOT diameter 2.27 cm    LVOT area 4.0 cm2    LVOT peak collin 1.10 m/s    LVOT peak VTI 23.20 cm    Ao peak collin 1.46 m/s    Ao VTI 34.5 cm    LVOT stroke volume 93.84 cm3    AV peak gradient 9 mmHg    TV rest pulmonary artery pressure 25 mmHg    E/E' ratio 10.22 m/s    MV Peak E Collin 0.92 m/s    TR Max Collin 2.35 m/s    MV Peak A Collin 0.64 m/s    PV Peak S Collin 0.6724390946 m/s    PV Peak D Collin 0.26 m/s    LV Systolic Volume 80.47 mL    LV Systolic Volume Index 37.8 mL/m2    LV Diastolic Volume 102.85 mL    LV Diastolic Volume Index 48.29 mL/m2    LA Volume Index 33.0 mL/m2    LV Mass Index 50 g/m2    RA Major Axis 4.92 cm    Left Atrium Minor Axis 5.00 cm    Left Atrium Major Axis 5.15 cm    Triscuspid Valve Regurgitation Peak Gradient 22 mmHg    LA Volume Index (Mod) 30.5 mL/m2     LA volume (mod) 65.00 cm3    RA Width 3.80 cm    Narrative    1. Left ventricle: Normal in size with normal systolic function. LV   ejection fraction is 55-60%. No regional wall motion abnormalities are   evident. Normal LV geometry.  2. Left ventricular diastolic function: Normal.  3. Right ventricle: Normal in size with normal systolic function.   4. Estimated PASP: <40 mmHg normal Estimated RAP: 3 mmHg based on IVC   assessment.  5. Left atrium: Normal in size.  Right atrium: Normal in size  6. Valves: No clinically significant abnormalities detected.  7. Aorta: Normal caliber when indexed for BSA  8. Pericardium: Normal thickness No effusion present.  9. Technical quality is adequate. challenging due to acoustic windows.   Echo contrast was utilized to improve endocardial visualization.  10. No prior studies available for comparison       Assessment and Plan:     * NSTEMI (non-ST elevated myocardial infarction)  Echo:   1. Left ventricle: Normal in size with normal systolic function. LV ejection fraction is 55-60%. No regional wall motion abnormalities are evident. Normal LV geometry.  2. Left ventricular diastolic function: Normal.  3. Right ventricle: Normal in size with normal systolic function.   4. Estimated PASP: <40 mmHg normal Estimated RAP: 3 mmHg based on IVC assessment.  5. Left atrium: Normal in size.  Right atrium: Normal in size  6. Valves: No clinically significant abnormalities detected.  7. Aorta: Normal caliber when indexed for BSA  8. Pericardium: Normal thickness No effusion present.  9. Technical quality is adequate. challenging due to acoustic windows. Echo contrast was utilized to improve endocardial visualization.  10. No prior studies available for comparison        [facility] 07/25/2022     · There is two vessel coronary artery disease.  · The Mid Cx to Dist Cx lesion was approximately 95% stenosed.  · The distal RPDA lesion was approximately 99% stenosed.  · Nonobstructive coronary disease  present elsewhere.  · Left ventricular end diastolic pressure was 12 mmHg.  · Recommend transfer to PCI capable facility for consultation with Interventional Cardiology.      Patient transferred for PCI  NPO  Continue DAPT, heparin, BB, statin      Mixed hyperlipidemia  Continue statin     Abdominal aortic aneurysm (AAA) without rupture  03/2022 US- Infrarenal abdominal aortic aneurysm measuring up to 5 cm in diameter (previously 4.8 cm in diameter on 04/15/2021).  Followed by Dr Lyon  Continue asa, statin, smoking cessation     Controlled type 2 diabetes mellitus without complication, without long-term current use of insulin  AccuCkecks AC/HS with SSI    Tobacco abuse  Smoking cessation     Essential hypertension  SBP 110s-120s  Continue BB        VTE Risk Mitigation (From admission, onward)         Ordered     heparin 25,000 units in dextrose 5% 250 mL (100 units/mL) infusion LOW INTENSITY nomogram - [facility]  Continuous        Question Answer Comment   Heparin Infusion Adjustment (DO NOT MODIFY ANSWER) \\[email or web address]\epic\Images\Pharmacy\HeparinInfusions\heparin LOW INTENSITY nomogram for [facility] [id number].pdf    Begin at (in units/kg/hr) 12        07/27/22 0708     heparin 25,000 units in dextrose 5% (100 units/ml) IV bolus from bag - ADDITIONAL PRN BOLUS - 60 units/kg (max bolus 4000 units)  As needed (PRN)        Question:  Heparin Infusion Adjustment (DO NOT MODIFY ANSWER)  Answer:  \\[email or web address]\epic\Images\Pharmacy\HeparinInfusions\heparin LOW INTENSITY nomogram for [facility] [id number].pdf    07/27/22 0708     heparin 25,000 units in dextrose 5% (100 units/ml) IV bolus from bag - ADDITIONAL PRN BOLUS - 30 units/kg (max bolus 4000 units)  As needed (PRN)        Question:  Heparin Infusion Adjustment (DO NOT MODIFY ANSWER)  Answer:  \\[email or web address]\epic\Images\Pharmacy\HeparinInfusions\heparin LOW INTENSITY nomogram for [facility] [id number].pdf    07/27/22 0708     IP VTE HIGH RISK PATIENT  Once         07/27/22 0325     Place sequential  compression device  Until discontinued         07/27/22 2071                Thank you for your consult. I will follow-up with patient. Please contact us if you have any additional questions.    Guy Holt NP  Cardiology   [facility] - Telemetry

## 2022-07-27 NOTE — PLAN OF CARE
Report called to D[facility] in cath lab and aware a ICU bed is assigned-----558 and ICU was updated---spoke to Gudelia---charge nurse

## 2022-07-27 NOTE — ASSESSMENT & PLAN NOTE
Stable  - AAA 43mm on 5/21 - seen by vascular.  - CXR w/o concern for widening mediastinum

## 2022-07-27 NOTE — SUBJECTIVE & OBJECTIVE
Past Medical History:   Diagnosis Date    Diabetes     Diabetes mellitus, type 2     GERD (gastroesophageal reflux disease)     Hypercholesteremia     Hypertension        Past Surgical History:   Procedure Laterality Date    COLONOSCOPY N/A 7/9/2019    Procedure: COLONOSCOPY;  Surgeon: Luis Bogran-Reyes, MD;  Location: [facility];  Service: Endoscopy;  Laterality: N/A;    CORONARY ANGIOGRAPHY Right 7/25/2022    Procedure: ANGIOGRAM, CORONARY ARTERY;  Surgeon: Tawnya Pruett MD;  Location: [facility] CATH LAB;  Service: Cardiology;  Laterality: Right;    LEFT HEART CATHETERIZATION Left 7/25/2022    Procedure: Left heart cath;  Surgeon: Tawnya Pruett MD;  Location: [facility] CATH LAB;  Service: Cardiology;  Laterality: Left;       Review of patient's allergies indicates:  No Known Allergies    Current Facility-Administered Medications on File Prior to Encounter   Medication    [DISCONTINUED] acetaminophen tablet 650 mg    [DISCONTINUED] acetaminophen tablet 650 mg    [DISCONTINUED] aluminum-magnesium hydroxide-simethicone 200-200-20 mg/5 mL suspension 30 mL    [DISCONTINUED] aspirin chewable tablet 81 mg    [DISCONTINUED] atorvastatin tablet 80 mg    [DISCONTINUED] clopidogreL tablet 75 mg    [DISCONTINUED] dextrose 10% bolus 125 mL    [DISCONTINUED] dextrose 10% bolus 125 mL    [DISCONTINUED] dextrose 10% bolus 250 mL    [DISCONTINUED] dextrose 10% bolus 250 mL    [DISCONTINUED] glucagon (human recombinant) injection 1 mg    [DISCONTINUED] glucose chewable tablet 16 g    [DISCONTINUED] glucose chewable tablet 24 g    [DISCONTINUED] heparin 25,000 units in dextrose 5% (100 units/ml) IV bolus from bag - ADDITIONAL PRN BOLUS - 30 units/kg (max bolus 4000 units)    [DISCONTINUED] heparin 25,000 units in dextrose 5% (100 units/ml) IV bolus from bag - ADDITIONAL PRN BOLUS - 60 units/kg (max bolus 4000 units)    [DISCONTINUED] heparin 25,000 units in dextrose 5% 250 mL (100 units/mL) infusion LOW INTENSITY nomogram  - [facility]    [DISCONTINUED] insulin aspart U-100 pen 0-5 Units    [DISCONTINUED] melatonin tablet 6 mg    [DISCONTINUED] metoprolol succinate (TOPROL-XL) 24 hr tablet 50 mg    [DISCONTINUED] nitroGLYCERIN SL tablet 0.4 mg    [DISCONTINUED] ondansetron disintegrating tablet 8 mg    [DISCONTINUED] oxyCODONE immediate release tablet 5 mg    [DISCONTINUED] pantoprazole EC tablet 40 mg    [DISCONTINUED] sodium chloride 0.9% flush 10 mL     Current Outpatient Medications on File Prior to Encounter   Medication Sig    aspirin (ECOTRIN) 81 MG EC tablet Take 81 mg by mouth once daily.    diclofenac sodium (VOLTAREN) 1 % Gel Apply 2 g topically 4 (four) times daily.    ezetimibe (ZETIA) 10 mg tablet Take 1 tablet (10 mg total) by mouth once daily.    isosorbide mononitrate (IMDUR) 30 MG 24 hr tablet Take 1 tablet (30 mg total) by mouth once daily.    LIDOcaine (LIDODERM) 5 % Place 1 patch onto the skin once daily. Remove & Discard patch within 12 hours or as directed by MD    metFORMIN (GLUCOPHAGE) 500 MG tablet TAKE 1 TABLET(500 MG) BY MOUTH TWICE DAILY WITH MEALS    metoprolol succinate (TOPROL-XL) 50 MG 24 hr tablet Take 1 tablet (50 mg total) by mouth once daily.    omeprazole (PRILOSEC) 40 MG capsule Take 1 capsule (40 mg total) by mouth once daily.    rosuvastatin (CRESTOR) 40 MG Tab Take 1 tablet (40 mg total) by mouth every evening.    vitamin D (VITAMIN D3) 1000 units Tab Take 1,000 Units by mouth once daily.     Family History       Problem Relation (Age of Onset)    Cancer Mother, Father    Heart disease Father, Paternal Grandmother    Hypertension Mother          Tobacco Use    Smoking status: Current Every Day Smoker     Packs/day: 1.00     Years: 39.00     Pack years: 39.00     Types: Cigarettes    Smokeless tobacco: Never Used   Substance and Sexual Activity    Alcohol use: Yes     Comment: occ    Drug use: No    Sexual activity: Yes     Partners: Female     Review of Systems   Constitutional: Negative for  diaphoresis.   HENT: Negative.     Eyes: Negative.    Cardiovascular:  Positive for chest pain. Negative for leg swelling, near-syncope, orthopnea, palpitations, paroxysmal nocturnal dyspnea and syncope.   Respiratory:  Positive for shortness of breath. Negative for cough.    Endocrine: Negative.    Hematologic/Lymphatic: Negative.    Skin: Negative.    Musculoskeletal: Negative.    Gastrointestinal:  Negative for nausea and vomiting.   Genitourinary: Negative.    Neurological: Negative.    Psychiatric/Behavioral: Negative.     Allergic/Immunologic: Negative.    Objective:     Vital Signs (Most Recent):  Temp: 97.6 °F (36.4 °C) (07/27/22 0738)  Pulse: 60 (07/27/22 0738)  Resp: 16 (07/27/22 0738)  BP: 128/80 (07/27/22 0738)  SpO2: 95 % (07/27/22 0857) Vital Signs (24h Range):  Temp:  [97.5 °F (36.4 °C)-98.7 °F (37.1 °C)] 97.6 °F (36.4 °C)  Pulse:  [58-71] 60  Resp:  [16-20] 16  SpO2:  [93 %-97 %] 95 %  BP: (114-139)/(76-92) 128/80     Weight: 93.5 kg (206 lb 2.1 oz)  Body mass index is 29.58 kg/m².    SpO2: 95 %  O2 Device (Oxygen Therapy): room air      Intake/Output Summary (Last 24 hours) at 7/27/2022 0954  Last data filed at 7/27/2022 0300  Gross per 24 hour   Intake 0 ml   Output --   Net 0 ml       Lines/Drains/Airways       Peripheral Intravenous Line  Duration                  Peripheral IV - Single Lumen 07/23/22 1924 20 G Left Antecubital 3 days         Peripheral IV - Single Lumen 07/24/22 0038 20 G Left Hand 3 days                    Physical Exam  Constitutional:       General: He is not in acute distress.     Appearance: He is obese. He is not diaphoretic.   HENT:      Head: Atraumatic.   Eyes:      General:         Right eye: No discharge.         Left eye: No discharge.   Cardiovascular:      Rate and Rhythm: Normal rate and regular rhythm.      Heart sounds: No murmur heard.    No friction rub. No gallop.   Pulmonary:      Effort: Pulmonary effort is normal.      Breath sounds: Normal breath sounds.    Abdominal:      General: Bowel sounds are normal.      Palpations: Abdomen is soft.   Musculoskeletal:      Right lower leg: No edema.      Left lower leg: No edema.   Skin:     General: Skin is warm and dry.      Capillary Refill: Capillary refill takes 2 to 3 seconds.   Neurological:      Mental Status: He is alert and oriented to person, place, and time. Mental status is at baseline.   Psychiatric:         Mood and Affect: Mood normal.         Behavior: Behavior normal.         Thought Content: Thought content normal.         Judgment: Judgment normal.       Significant Labs: BMP:   Recent Labs   Lab 07/26/22 0240 07/27/22 0622   GLU 90 92    138   K 4.0 4.1    105   CO2 24 23   BUN 14 14   CREATININE 0.9 0.8   CALCIUM 9.8 9.9   MG 1.9 2.0   , CMP   Recent Labs   Lab 07/26/22 0240 07/27/22 0622    138   K 4.0 4.1    105   CO2 24 23   GLU 90 92   BUN 14 14   CREATININE 0.9 0.8   CALCIUM 9.8 9.9   PROT 7.8 7.9   ALBUMIN 3.9 3.8   BILITOT 0.6 0.4   ALKPHOS 99 96   AST 17 21   ALT 21 25   ANIONGAP 11 10   ESTGFRAFRICA >60.0 >60   EGFRNONAA >60.0 >60   , CBC   Recent Labs   Lab 07/26/22 0240 07/27/22 0622 07/27/22 0721   WBC 10.82 5.82 5.79   HGB 16.3 17.0 16.5   HCT 50.1 50.4 51.0    211 214   , INR   Recent Labs   Lab 07/27/22 0721   INR 1.1   , Lipid Panel No results for input(s): CHOL, HDL, LDLCALC, TRIG, CHOLHDL in the last 48 hours., Troponin No results for input(s): TROPONINI in the last 48 hours., and All pertinent lab results from the last 24 hours have been reviewed.    Significant Imaging: Echocardiogram: Transthoracic echo (TTE) complete (Cupid Only):   Results for orders placed or performed during the hospital encounter of 07/23/22   Echo   Result Value Ref Range    BSA 2.16 m2    TDI SEPTAL 0.06 m/s    LV LATERAL E/E' RATIO 7.67 m/s    LV SEPTAL E/E' RATIO 15.33 m/s    LA WIDTH 4.00 cm    Right Atrial Pressure (from IVC) 3 mmHg    IVC diameter 1.35 cm    RV mid  diameter 3.00 cm    QEF 63 %    EF 60 %    Left Ventricular Outflow Tract Mean Velocity 0.5974236510 cm/s    Left Ventricular Outflow Tract Mean Gradient 2.34 mmHg    TDI LATERAL 0.12 m/s    LVIDd 4.71 3.5 - 6.0 cm    IVS 0.79 0.6 - 1.1 cm    Posterior Wall 0.64 0.6 - 1.1 cm    LVIDs 4.24 (A) 2.1 - 4.0 cm    FS 10 28 - 44 %    LA volume 70.21 cm3    STJ 2.97 cm    Ascending aorta 3.06 cm    LV mass 106.25 g    LA size 4.07 cm    RVDD 3.43 cm    TAPSE 1.80 cm    Right ventricular length in diastole (apical 4-chamber view) 6.50 cm    RV S' 11 cm/s    Left Ventricle Relative Wall Thickness 0.27 cm    AV mean gradient 5 mmHg    AV valve area 2.72 cm2    AV Velocity Ratio 0.75     AV index (prosthetic) 0.67     E/A ratio 1.44     Mean e' 0.09 m/s    E wave deceleration time [id number] msec    Pulm vein S/D ratio 2.15     LVOT diameter 2.27 cm    LVOT area 4.0 cm2    LVOT peak collin 1.10 m/s    LVOT peak VTI 23.20 cm    Ao peak collin 1.46 m/s    Ao VTI 34.5 cm    LVOT stroke volume 93.84 cm3    AV peak gradient 9 mmHg    TV rest pulmonary artery pressure 25 mmHg    E/E' ratio 10.22 m/s    MV Peak E Collin 0.92 m/s    TR Max Collin 2.35 m/s    MV Peak A Collin 0.64 m/s    PV Peak S Collin 0.4597047754 m/s    PV Peak D Collin 0.26 m/s    LV Systolic Volume 80.47 mL    LV Systolic Volume Index 37.8 mL/m2    LV Diastolic Volume 102.85 mL    LV Diastolic Volume Index 48.29 mL/m2    LA Volume Index 33.0 mL/m2    LV Mass Index 50 g/m2    RA Major Axis 4.92 cm    Left Atrium Minor Axis 5.00 cm    Left Atrium Major Axis 5.15 cm    Triscuspid Valve Regurgitation Peak Gradient 22 mmHg    LA Volume Index (Mod) 30.5 mL/m2    LA volume (mod) 65.00 cm3    RA Width 3.80 cm    Narrative    1. Left ventricle: Normal in size with normal systolic function. LV   ejection fraction is 55-60%. No regional wall motion abnormalities are   evident. Normal LV geometry.  2. Left ventricular diastolic function: Normal.  3. Right ventricle: Normal in size with  normal systolic function.   4. Estimated PASP: <40 mmHg normal Estimated RAP: 3 mmHg based on IVC   assessment.  5. Left atrium: Normal in size.  Right atrium: Normal in size  6. Valves: No clinically significant abnormalities detected.  7. Aorta: Normal caliber when indexed for BSA  8. Pericardium: Normal thickness No effusion present.  9. Technical quality is adequate. challenging due to acoustic windows.   Echo contrast was utilized to improve endocardial visualization.  10. No prior studies available for comparison

## 2022-07-27 NOTE — NURSING
Patient to cath lab with chart and consent per iesha w/ nurse Dahiana and family to 5th floor icu waiting room; tele box returned to 4th floor

## 2022-07-27 NOTE — PLAN OF CARE
07/27/22 0906   Nurse Notification   Bedside Nurse Notified? Yes   Name of Bedside Nurse Yessica, bedside pharmacist   Nurse Notfication Method Secure Chat   Nurse Notified Of Lab Values;Orders  (baseline PTT resulted= 31.7.  VN requested to be notified once hep drip initiated so that order for PTT can be placed to be drawn 6hrs post drip initiation)

## 2022-07-27 NOTE — Clinical Note
150 ml of contrast were injected throughout the case. 250 mL of contrast was the total wasted during the case. 400 mL was the total amount used during the case.

## 2022-07-27 NOTE — NURSING
2 cc of air removed from right radial vasc band. No hematoma noted. Will continue to monitor.Pain coming back from chest to upper chest to throat as earlier and level about the same; tridil off at present; sinus lizzy on monitor; no other symptoms reported; resp even and unlabored; skin wm dry color pk; o2 per nasal cannula

## 2022-07-27 NOTE — NURSING
No longer has neck pain but chest pain is tight and sharp; md present and aware; bp 102/71 and tridil decreased to 10 mcg/min

## 2022-07-27 NOTE — NURSING
tridil turned off; pts bp 86/57; pt resting quietly and remains w/ upper chest sharp tightness 6-7/10

## 2022-07-27 NOTE — Clinical Note
Dr. Hari Curiel    [address] [place] [address]     **Report to outpatient surgery center next to [facility]  **Please call [phone] with any questions     EGD (Upper GI Endoscopy or Gastroscopy)    Patient instructions      ? Patients with diabetes should check for special instructions beforehand.    ? Patients taking blood-thinners like Coumadin, Plavix, Pradaxa, and Aspirin should check for additional instructions beforehand.  These may need to be temporarily stopped.    ? Do not eat after midnight, on the night before the test.  You may have sips of clear liquids until 6 hours before the procedure start time.  Please take all of your prescribed medications unless instructed differently by a medical staff member.    ? Special instructions: Do not take vitamins, supplements, or anti-inflammatory medications for 3 days prior to procedure.     ? Due to the sedatives used for the procedure, you will be unable to drive yourself home or go back to work for the remainder of the day.  Please make arrangements for a *responsible adult to drive you home.  (*A responsible person is someone age 18 or older who can receive and understand instructions, stay with you, and call for assistance as instructed.)    ? Arrive at the [facility] on 4/10/17        Check in @ 11:00 AM      Procedure @ 12:00 PM    Clear Liquid Diet List:  Do not eat or drink anything red or purple.  Beverages: soft drinks/soda, Gatorade or Regan-Aid, clear fruit juices without pulp, water, tea, coffee (no milk or non dairy creamer). Broths: chicken, beef or vegetable. Desserts: hard candies, Jell-O, Popsicles. (No fruit bars or sherbet)   The catheter was removed from the ostium   left main.

## 2022-07-27 NOTE — BRIEF OP NOTE
Procedure: Coronary angiogram, IVUS guided angioplasty distal OM  Access: Left radial artery  Indication: CP    Findings  Co-dominant  LM: Normal  LAD: LI  RI: LI  LCx: Patent stent; distal % occlusion  RCA: Distal 99% stenosis (small territory distally)    Intervention  - Able to cross with work horse wire with knuckle into the distal OM  - IVUS with no dissection or thrombus (?spasm)  - IC NTG given with improvement  - Vessel closure upon removal of wire  - Re-wired with knuckle  - Mid LCx stent post dilated with 2.5 NC  - Distal OM angioplasty with 1.5 and 2.0 compliant balloon  - Recovery of distal circulation  - Given exceedingly small vessel diameter and patent distal circulation, decision made not to stent    Recommendation  - Aspirin 81 mg daily for life  - Clopidogrel 75 mg daily for at least 1 year  - High intensity statin  - BB  - Cardiac rehab referral  - Tobacco cessation  - FUP in cardiology clinic. Consider distal RCA revascularization if recurrent symptoms, and >10% LV myocardium  - NTG gtt for CP  - Hep gtt low intensity (no bolus) 6 hours after sheath removal (L-radial)    Updated wife Lis over the phone

## 2022-07-27 NOTE — HPI
Per  Note: Adrian Benton is a 61-year-old male with a history of abdominal aortic aneurysm, diabetes, gastroesophageal reflux disease, hyperlipidemia, hypertension, coronary artery disease, and tobacco use admitted to [facility] on July 23 with chest pain.  Cardiac enzymes were mildly elevated.  He was admitted with NSTEMI.  He was treated with ACS protocol and taken for coronary angiography on July 25. He was found have 95% high-grade circumflex lesion and 99% high-grade distal PDA lesion.  Recommendation was for transfer to a facility with Interventional capabilities.  Transfer Center spoke with Interventional Cardiology at [facility].  Requesting transfer to Hospital Medicine at [facility] for further treatment.  He is currently on aspirin, Plavix, statin, beta-blocker, and heparin infusion.  Referring provider noted patient has been chest pain-free during his stay.      July 25:  PTT 40.8, magnesium 1.9, sodium 139, potassium 3.9, chloride 105, CO2 23, BUN 13, creatinine 0.8, glucose 105, AST 15, ALT 19, magnesium 3.3, white blood cells 8.47, hemoglobin 16, hematocrit 49.4, platelets 256     July 24:  COVID negative     July 23:  Troponin 0.033, 0.036  -chest x-ray had no acute findings

## 2022-07-27 NOTE — ASSESSMENT & PLAN NOTE
Patient's FSGs are controlled on current medication regimen.  Last A1c reviewed-   Lab Results   Component Value Date    HGBA1C 6.2 (H) 07/27/2022     Most recent fingerstick glucose reviewed-   Recent Labs   Lab 07/26/22  1139 07/26/22  1704 07/26/22 2000   POCTGLUCOSE 92 89 100     Current correctional scale  Low  Maintain anti-hyperglycemic dose as follows-   Antihyperglycemics (From admission, onward)            Start     Stop Route Frequency Ordered    07/27/22 0708  insulin aspart U-100 pen 0-5 Units         -- SubQ Every 6 hours PRN 07/27/22 0708        Hold Oral hypoglycemics while patient is in the hospital.

## 2022-07-27 NOTE — PLAN OF CARE
Patient to cath recovery with nurse Abel julian w/ 2 rails up; pt connected to monitors; pt aao; pt has upper chest and neck pressure 6/10 and same as in cath lab procedure and will monitor pt; skin wm dry color pk; radial and dp pulses palpable bilat; sinus lizzy on monitor; right radial access site w vasc band and no bleeding nor hematoma; skin wm dry color pk; no other complaints; tele box and chart at bedside

## 2022-07-27 NOTE — PLAN OF CARE
SW met with pt at bedside to discuss dc planning.     Pt confirmed information on chart. Pt reported that he resides in his home with Lis Aguilar (Spouse) [phone]. Pt is independent in ADLs. Pt reported that no HH/DME at home. Pt reported upon dc his wife will transport him home. SW wrote contact information on board for any questions or concerns. SW will continue to follow pt throughout his transitions of care and assist with any dc needs.     Cardi follow up pending    Future Appointments   Date Time Provider Department Center   8/4/2022  1:00 PM Darion Merino II, NP [facility]   8/18/2022  8:30 AM FANNY Esquivel IV, MD [facility]   10/14/2022  7:55 AM [facility]   10/20/2022  8:00 AM Darion Merino II, NP [facility]   12/5/2022  1:30 PM [address] [facility]   12/13/2022 10:00 AM Jose Lyon MD [facility]          07/27/22 1151   Discharge Assessment   Assessment Type Discharge Planning Assessment   Confirmed/corrected address, phone number and insurance Yes   Confirmed Demographics Correct on Facesheet   Source of Information patient   Communicated ISABEL with patient/caregiver Yes   Reason For Admission NSTEMI (non-ST elevated myocardial infarction)   Lives With spouse   Do you expect to return to your current living situation? Yes   Do you have help at home or someone to help you manage your care at home? Yes   Who are your caregiver(s) and their phone number(s)? Lis Aguilar (Spouse)   [phone]   Prior to hospitilization cognitive status: Alert/Oriented   Current cognitive status: Alert/Oriented   Walking or Climbing Stairs Difficulty none   Dressing/Bathing Difficulty none   Home Layout Able to live on 1st floor   Equipment Currently Used at Home none   Patient currently being followed by outpatient case management? No   Do you currently have service(s) that help you manage your care at home? No   Do  you take prescription medications? Yes   Do you have prescription coverage? Yes   Coverage Medicaid   Do you have any problems affording any of your prescribed medications? No   Is the patient taking medications as prescribed? yes   Who is going to help you get home at discharge? Lis Aguilar (Spouse)   [phone]   How do you get to doctors appointments? family or friend will provide;car, drives self   Are you on dialysis? No   Do you take coumadin? No   Discharge Plan A Home with family   DME Needed Upon Discharge    (TBD)   Discharge Plan discussed with: Patient   Discharge Barriers Identified None   Relationship/Environment   Name(s) of Who Lives With Patient Lis Aguilar (Spouse)   [phone]

## 2022-07-27 NOTE — TELEPHONE ENCOUNTER
Letter regarding Phase II cardiac rehab was sent to patient, along with telephone # for [facility].  Patient lives in [place], [place].  Yana Rowe RN  Cardiac Rehab Nurse

## 2022-07-27 NOTE — ASSESSMENT & PLAN NOTE
Echo:   1. Left ventricle: Normal in size with normal systolic function. LV ejection fraction is 55-60%. No regional wall motion abnormalities are evident. Normal LV geometry.  2. Left ventricular diastolic function: Normal.  3. Right ventricle: Normal in size with normal systolic function.   4. Estimated PASP: <40 mmHg normal Estimated RAP: 3 mmHg based on IVC assessment.  5. Left atrium: Normal in size.  Right atrium: Normal in size  6. Valves: No clinically significant abnormalities detected.  7. Aorta: Normal caliber when indexed for BSA  8. Pericardium: Normal thickness No effusion present.  9. Technical quality is adequate. challenging due to acoustic windows. Echo contrast was utilized to improve endocardial visualization.  10. No prior studies available for comparison        [facility] [place] 07/25/2022     · There is two vessel coronary artery disease.  · The Mid Cx to Dist Cx lesion was approximately 95% stenosed.  · The distal RPDA lesion was approximately 99% stenosed.  · Nonobstructive coronary disease present elsewhere.  · Left ventricular end diastolic pressure was 12 mmHg.  · Recommend transfer to PCI capable facility for consultation with Interventional Cardiology.      Patient transferred for PCI  NPO  Continue DAPT, heparin, BB, statin

## 2022-07-27 NOTE — ASSESSMENT & PLAN NOTE
03/2022 US- Infrarenal abdominal aortic aneurysm measuring up to 5 cm in diameter (previously 4.8 cm in diameter on 04/15/2021).  Followed by Dr Lyon  Continue asa, statin, smoking cessation

## 2022-07-27 NOTE — BRIEF OP NOTE
Procedure: Coronary angiogram, LVEDP, IVUS guided PCI LCx mid and distal  Access: Right radial  Indication: NSTEMI    Findings  Co-dominant  LM: Normal  LAD: LI  RI: LI  LCx: Mid-distal diffuse disease; distal segment 99% stenosis  RCA: Not visualized; known distal 99% stenosis (small territory distally)    LVEDP 10    Intevention  - IVUS with diffuse disease mid-distal LCx  -Status post IVUS guided PCI mid-distal LCx with 3.0 x26 and 2.5 x 18 RAMILA  - Post dilated with 2.0, 2.5 and 3.0 NC balloon with good results  - Possible distal dissection. Asymptomatic. Medical management    Recommendations  - Aspirin 81 mg daily for life  - Clopidogrel 75 mg daily for at least 1 year  - High intensity statin  - BB  - Cardiac rehab referral  - Tobacco cessation  - FUP in cardiology clinic. Consider distal RCA revascularization if recurrent symptoms, and >10% LV myocardium    Updated wife Lis over the phone

## 2022-07-27 NOTE — NURSING
Dr gonzalez called and aware of pts bp and pain status; will hold tridil for now and monitor pt in cath recovery until further orders; icu bed assigned----[id number]

## 2022-07-27 NOTE — H&P
[facility] Medicine  History & Physical    Patient Name: Adrian Benton  MRN: [id number]  Patient Class: IP- Inpatient  Admission Date: 7/27/2022  Attending Physician: Henok Delgado, *   Primary Care Provider: Darion Merino II, NP         Patient information was obtained from patient, past medical records and ER records.     Subjective:     Principal Problem:NSTEMI (non-ST elevated myocardial infarction)    Chief Complaint: No chief complaint on file.       HPI: Per  Note: Adrian Benton is a 61-year-old male with a history of abdominal aortic aneurysm, diabetes, gastroesophageal reflux disease, hyperlipidemia, hypertension, coronary artery disease, and tobacco use admitted to [facility] on July 23 with chest pain.  Cardiac enzymes were mildly elevated.  He was admitted with NSTEMI.  He was treated with ACS protocol and taken for coronary angiography on July 25. He was found have 95% high-grade circumflex lesion and 99% high-grade distal PDA lesion.  Recommendation was for transfer to a facility with Interventional capabilities.  Transfer Center spoke with Interventional Cardiology at [facility].  Requesting transfer to Hospital Medicine at [facility] for further treatment.  He is currently on aspirin, Plavix, statin, beta-blocker, and heparin infusion.  Referring provider noted patient has been chest pain-free during his stay.      July 25:  PTT 40.8, magnesium 1.9, sodium 139, potassium 3.9, chloride 105, CO2 23, BUN 13, creatinine 0.8, glucose 105, AST 15, ALT 19, magnesium 3.3, white blood cells 8.47, hemoglobin 16, hematocrit 49.4, platelets 256     July 24:  COVID negative     July 23:  Troponin 0.033, 0.036  -chest x-ray had no acute findings      Past Medical History:   Diagnosis Date    Diabetes     Diabetes mellitus, type 2     GERD (gastroesophageal reflux disease)     Hypercholesteremia     Hypertension        Past Surgical History:   Procedure  Laterality Date    COLONOSCOPY N/A 7/9/2019    Procedure: COLONOSCOPY;  Surgeon: Luis Bogran-Reyes, MD;  Location: [facility];  Service: Endoscopy;  Laterality: N/A;    CORONARY ANGIOGRAPHY Right 7/25/2022    Procedure: ANGIOGRAM, CORONARY ARTERY;  Surgeon: Tawnya Pruett MD;  Location: [facility] CATH LAB;  Service: Cardiology;  Laterality: Right;    LEFT HEART CATHETERIZATION Left 7/25/2022    Procedure: Left heart cath;  Surgeon: Tawnya Pruett MD;  Location: [facility] CATH LAB;  Service: Cardiology;  Laterality: Left;       Review of patient's allergies indicates:  No Known Allergies    Current Facility-Administered Medications on File Prior to Encounter   Medication    [DISCONTINUED] acetaminophen tablet 650 mg    [DISCONTINUED] acetaminophen tablet 650 mg    [DISCONTINUED] aluminum-magnesium hydroxide-simethicone 200-200-20 mg/5 mL suspension 30 mL    [DISCONTINUED] aspirin chewable tablet 81 mg    [DISCONTINUED] atorvastatin tablet 80 mg    [DISCONTINUED] clopidogreL tablet 75 mg    [DISCONTINUED] dextrose 10% bolus 125 mL    [DISCONTINUED] dextrose 10% bolus 125 mL    [DISCONTINUED] dextrose 10% bolus 250 mL    [DISCONTINUED] dextrose 10% bolus 250 mL    [DISCONTINUED] glucagon (human recombinant) injection 1 mg    [DISCONTINUED] glucose chewable tablet 16 g    [DISCONTINUED] glucose chewable tablet 24 g    [DISCONTINUED] heparin 25,000 units in dextrose 5% (100 units/ml) IV bolus from bag - ADDITIONAL PRN BOLUS - 30 units/kg (max bolus 4000 units)    [DISCONTINUED] heparin 25,000 units in dextrose 5% (100 units/ml) IV bolus from bag - ADDITIONAL PRN BOLUS - 60 units/kg (max bolus 4000 units)    [DISCONTINUED] heparin 25,000 units in dextrose 5% 250 mL (100 units/mL) infusion LOW INTENSITY nomogram - [facility]    [DISCONTINUED] insulin aspart U-100 pen 0-5 Units    [DISCONTINUED] melatonin tablet 6 mg    [DISCONTINUED] metoprolol succinate (TOPROL-XL) 24 hr tablet 50 mg     [DISCONTINUED] nitroGLYCERIN SL tablet 0.4 mg    [DISCONTINUED] ondansetron disintegrating tablet 8 mg    [DISCONTINUED] oxyCODONE immediate release tablet 5 mg    [DISCONTINUED] pantoprazole EC tablet 40 mg    [DISCONTINUED] sodium chloride 0.9% flush 10 mL     Current Outpatient Medications on File Prior to Encounter   Medication Sig    aspirin (ECOTRIN) 81 MG EC tablet Take 81 mg by mouth once daily.    diclofenac sodium (VOLTAREN) 1 % Gel Apply 2 g topically 4 (four) times daily.    ezetimibe (ZETIA) 10 mg tablet Take 1 tablet (10 mg total) by mouth once daily.    isosorbide mononitrate (IMDUR) 30 MG 24 hr tablet Take 1 tablet (30 mg total) by mouth once daily.    LIDOcaine (LIDODERM) 5 % Place 1 patch onto the skin once daily. Remove & Discard patch within 12 hours or as directed by MD    metFORMIN (GLUCOPHAGE) 500 MG tablet TAKE 1 TABLET(500 MG) BY MOUTH TWICE DAILY WITH MEALS    metoprolol succinate (TOPROL-XL) 50 MG 24 hr tablet Take 1 tablet (50 mg total) by mouth once daily.    omeprazole (PRILOSEC) 40 MG capsule Take 1 capsule (40 mg total) by mouth once daily.    rosuvastatin (CRESTOR) 40 MG Tab Take 1 tablet (40 mg total) by mouth every evening.    vitamin D (VITAMIN D3) 1000 units Tab Take 1,000 Units by mouth once daily.     Family History       Problem Relation (Age of Onset)    Cancer Mother, Father    Heart disease Father, Paternal Grandmother    Hypertension Mother          Tobacco Use    Smoking status: Current Every Day Smoker     Packs/day: 1.00     Years: 39.00     Pack years: 39.00     Types: Cigarettes    Smokeless tobacco: Never Used   Substance and Sexual Activity    Alcohol use: Yes     Comment: occ    Drug use: No    Sexual activity: Yes     Partners: Female     Review of Systems   Constitutional:  Negative for diaphoresis, fatigue, fever and unexpected weight change.   HENT:  Negative for sore throat, trouble swallowing and voice change.    Eyes:  Negative for  photophobia and visual disturbance.   Respiratory:  Positive for shortness of breath. Negative for cough and wheezing.    Cardiovascular:  Negative for chest pain, palpitations and leg swelling.   Gastrointestinal:  Negative for abdominal pain, constipation, diarrhea, nausea and vomiting.   Endocrine: Negative for polydipsia, polyphagia and polyuria.   Genitourinary:  Negative for dysuria, flank pain and frequency.   Musculoskeletal:  Negative for arthralgias, back pain and myalgias.   Skin:  Negative for rash and wound.   Neurological:  Negative for dizziness, tremors, syncope, weakness, light-headedness, numbness and headaches.   Hematological:  Negative for adenopathy. Does not bruise/bleed easily.   Psychiatric/Behavioral:  Negative for confusion and dysphoric mood. The patient is not nervous/anxious.    Objective:     Vital Signs (Most Recent):  Temp: 97.6 °F (36.4 °C) (07/27/22 0738)  Pulse: 60 (07/27/22 0738)  Resp: 16 (07/27/22 0738)  BP: 128/80 (07/27/22 0738)  SpO2: (!) 93 % (07/27/22 0738)   Vital Signs (24h Range):  Temp:  [97.5 °F (36.4 °C)-98.7 °F (37.1 °C)] 97.6 °F (36.4 °C)  Pulse:  [58-71] 60  Resp:  [16-20] 16  SpO2:  [93 %-97 %] 93 %  BP: (114-139)/(76-92) 128/80     Weight: 93.5 kg (206 lb 2.1 oz)  Body mass index is 29.58 kg/m².    Physical Exam  Vitals and nursing note reviewed.   Constitutional:       Appearance: He is well-developed. He is obese.   HENT:      Head: Normocephalic and atraumatic.   Eyes:      Extraocular Movements: EOM normal.      Pupils: Pupils are equal, round, and reactive to light.   Neck:      Thyroid: No thyromegaly.      Trachea: No tracheal deviation.   Cardiovascular:      Rate and Rhythm: Normal rate and regular rhythm.      Heart sounds: No murmur heard.  Pulmonary:      Effort: Pulmonary effort is normal.      Breath sounds: Normal breath sounds. No wheezing.   Abdominal:      General: Bowel sounds are normal.      Palpations: Abdomen is soft.      Tenderness:  There is no abdominal tenderness.   Musculoskeletal:         General: No tenderness. Normal range of motion.      Cervical back: Normal range of motion and neck supple.      Right lower leg: No edema.      Left lower leg: No edema.   Lymphadenopathy:      Cervical: No cervical adenopathy.   Skin:     General: Skin is warm and dry.   Neurological:      Mental Status: He is alert and oriented to person, place, and time.      Cranial Nerves: No cranial nerve deficit.      Deep Tendon Reflexes: Reflexes are normal and symmetric.   Psychiatric:         Behavior: Behavior normal.         CRANIAL NERVES     CN III, IV, VI   Pupils are equal, round, and reactive to light.  Extraocular motions are normal.      Significant Labs: All pertinent labs within the past 24 hours have been reviewed.    Significant Imaging: I have reviewed all pertinent imaging results/findings within the past 24 hours.    Assessment/Plan:     * NSTEMI (non-ST elevated myocardial infarction)  Pt on ACS medical therapy  Transferred for PCI    [facility]   There is two vessel coronary artery disease.  · The Mid Cx to Dist Cx lesion was approximately 95% stenosed.  · The distal RPDA lesion was approximately 99% stenosed.  · Nonobstructive coronary disease present elsewhere.  · Left ventricular end diastolic pressure was 12 mmHg.  · Recommend transfer to PCI capable facility for consultation with Interventional Cardiology.        Abdominal aortic aneurysm (AAA) without rupture  Stable  - AAA 43mm on 5/21 - seen by vascular.  - CXR w/o concern for widening mediastinum            Essential hypertension  Currently controlled  Continue metoprolol and imdur      Mixed hyperlipidemia  Continue high intensity statin      Controlled type 2 diabetes mellitus without complication, without long-term current use of insulin  Patient's FSGs are controlled on current medication regimen.  Last A1c reviewed-   Lab Results   Component Value Date    HGBA1C 6.2 (H) 07/27/2022      Most recent fingerstick glucose reviewed-   Recent Labs   Lab 07/26/22  1139 07/26/22  1704 07/26/22 2000   POCTGLUCOSE 92 89 100     Current correctional scale  Low  Maintain anti-hyperglycemic dose as follows-   Antihyperglycemics (From admission, onward)            Start     Stop Route Frequency Ordered    07/27/22 0708  insulin aspart U-100 pen 0-5 Units         -- SubQ Every 6 hours PRN 07/27/22 0708        Hold Oral hypoglycemics while patient is in the hospital.    Tobacco abuse  1.5 ppd >40 yrs now 1ppd  The patient was counseled on the dangers of tobacco use >3 min  Nicotine patch prn        VTE Risk Mitigation (From admission, onward)         Ordered     heparin 25,000 units in dextrose 5% 250 mL (100 units/mL) infusion LOW INTENSITY nomogram - [facility]  Continuous        Question Answer Comment   Heparin Infusion Adjustment (DO NOT MODIFY ANSWER) \\[email or web address]\epic\Images\Pharmacy\HeparinInfusions\heparin LOW INTENSITY nomogram for [facility] [id number].pdf    Begin at (in units/kg/hr) 12        07/27/22 0708     heparin 25,000 units in dextrose 5% (100 units/ml) IV bolus from bag - ADDITIONAL PRN BOLUS - 60 units/kg (max bolus 4000 units)  As needed (PRN)        Question:  Heparin Infusion Adjustment (DO NOT MODIFY ANSWER)  Answer:  \\[email or web address]\epic\Images\Pharmacy\HeparinInfusions\heparin LOW INTENSITY nomogram for [facility] [id number].pdf    07/27/22 0708     heparin 25,000 units in dextrose 5% (100 units/ml) IV bolus from bag - ADDITIONAL PRN BOLUS - 30 units/kg (max bolus 4000 units)  As needed (PRN)        Question:  Heparin Infusion Adjustment (DO NOT MODIFY ANSWER)  Answer:  \\[email or web address]\epic\Images\Pharmacy\HeparinInfusions\heparin LOW INTENSITY nomogram for [facility] [id number].pdf    07/27/22 0708     IP VTE HIGH RISK PATIENT  Once         07/27/22 0325     Place sequential compression device  Until discontinued         07/27/22 0325                   Romy Jimenez PA-C  Department of Hospital Medicine   [facility]  Telemetry

## 2022-07-27 NOTE — Clinical Note
Phone report was given to CARLI Crocker for room 558. Pt. Transported on the continuous cardiac, BP and pulse oximetry monitors. Skin is PWD. Rhythm shows sinus bradycardia with HR- 58. No reports of chest pain or chest discomfort at this time.

## 2022-07-27 NOTE — Clinical Note
210 ml of contrast were injected throughout the case. 90 mL of contrast was the total wasted during the case. 300 mL was the total amount used during the case.

## 2022-07-27 NOTE — PLAN OF CARE
ekg tech to bedside; pt having upper chest tightness to neck as in cath lab and no change; will monitor

## 2022-07-27 NOTE — HOSPITAL COURSE
07/27/2022   Procedure: Coronary angiogram, IVUS guided angioplasty distal OM  Access: Left radial artery  Indication: CP     Findings  Co-dominant  LM: Normal  LAD: LI  RI: LI  LCx: Patent stent; distal % occlusion  RCA: Distal 99% stenosis (small territory distally)     Intervention  - Able to cross with work horse wire with knuckle into the distal OM  - IVUS with no dissection or thrombus (?spasm)  - IC NTG given with improvement  - Vessel closure upon removal of wire  - Re-wired with knuckle  - Mid LCx stent post dilated with 2.5 NC  - Distal OM angioplasty with 1.5 and 2.0 compliant balloon  - Recovery of distal circulation  - Given exceedingly small vessel diameter and patent distal circulation, decision made not to stent     Recommendation  - Aspirin 81 mg daily for life  - Clopidogrel 75 mg daily for at least 1 year  - High intensity statin  - BB  - Cardiac rehab referral  - Tobacco cessation  - FUP in cardiology clinic. Consider distal RCA revascularization if recurrent symptoms, and >10% LV myocardium    07/28/2022 No recurrent chest pain overnight and did not require any Tridil. Hemodynamically stable. PET stress ordered as outpatient to evaluate RCA ischemia.

## 2022-07-27 NOTE — NURSING
Dr Barba at bedside; Discussing pts chest and neck pain and going to bring pt back to cath lab for procedure and dr barba will speak to pts family; pt maintained npo; right radial acces site w/ vasc band; tridil off at present; ns tko; chest and neck pain 7/10 and constant and sharp tightness; icu called and updated and bed assignment changed to 558

## 2022-07-27 NOTE — ASSESSMENT & PLAN NOTE
1.5 ppd >40 yrs now 1ppd  The patient was counseled on the dangers of tobacco use >3 min  Nicotine patch prn

## 2022-07-27 NOTE — SUBJECTIVE & OBJECTIVE
Past Medical History:   Diagnosis Date    Diabetes     Diabetes mellitus, type 2     GERD (gastroesophageal reflux disease)     Hypercholesteremia     Hypertension        Past Surgical History:   Procedure Laterality Date    COLONOSCOPY N/A 7/9/2019    Procedure: COLONOSCOPY;  Surgeon: Luis Bogran-Reyes, MD;  Location: [facility];  Service: Endoscopy;  Laterality: N/A;    CORONARY ANGIOGRAPHY Right 7/25/2022    Procedure: ANGIOGRAM, CORONARY ARTERY;  Surgeon: Tawnya Pruett MD;  Location: [facility] CATH LAB;  Service: Cardiology;  Laterality: Right;    LEFT HEART CATHETERIZATION Left 7/25/2022    Procedure: Left heart cath;  Surgeon: Tawnya Pruett MD;  Location: [facility] CATH LAB;  Service: Cardiology;  Laterality: Left;       Review of patient's allergies indicates:  No Known Allergies    Current Facility-Administered Medications on File Prior to Encounter   Medication    [DISCONTINUED] acetaminophen tablet 650 mg    [DISCONTINUED] acetaminophen tablet 650 mg    [DISCONTINUED] aluminum-magnesium hydroxide-simethicone 200-200-20 mg/5 mL suspension 30 mL    [DISCONTINUED] aspirin chewable tablet 81 mg    [DISCONTINUED] atorvastatin tablet 80 mg    [DISCONTINUED] clopidogreL tablet 75 mg    [DISCONTINUED] dextrose 10% bolus 125 mL    [DISCONTINUED] dextrose 10% bolus 125 mL    [DISCONTINUED] dextrose 10% bolus 250 mL    [DISCONTINUED] dextrose 10% bolus 250 mL    [DISCONTINUED] glucagon (human recombinant) injection 1 mg    [DISCONTINUED] glucose chewable tablet 16 g    [DISCONTINUED] glucose chewable tablet 24 g    [DISCONTINUED] heparin 25,000 units in dextrose 5% (100 units/ml) IV bolus from bag - ADDITIONAL PRN BOLUS - 30 units/kg (max bolus 4000 units)    [DISCONTINUED] heparin 25,000 units in dextrose 5% (100 units/ml) IV bolus from bag - ADDITIONAL PRN BOLUS - 60 units/kg (max bolus 4000 units)    [DISCONTINUED] heparin 25,000 units in dextrose 5% 250 mL (100 units/mL) infusion LOW INTENSITY nomogram  - [facility]    [DISCONTINUED] insulin aspart U-100 pen 0-5 Units    [DISCONTINUED] melatonin tablet 6 mg    [DISCONTINUED] metoprolol succinate (TOPROL-XL) 24 hr tablet 50 mg    [DISCONTINUED] nitroGLYCERIN SL tablet 0.4 mg    [DISCONTINUED] ondansetron disintegrating tablet 8 mg    [DISCONTINUED] oxyCODONE immediate release tablet 5 mg    [DISCONTINUED] pantoprazole EC tablet 40 mg    [DISCONTINUED] sodium chloride 0.9% flush 10 mL     Current Outpatient Medications on File Prior to Encounter   Medication Sig    aspirin (ECOTRIN) 81 MG EC tablet Take 81 mg by mouth once daily.    diclofenac sodium (VOLTAREN) 1 % Gel Apply 2 g topically 4 (four) times daily.    ezetimibe (ZETIA) 10 mg tablet Take 1 tablet (10 mg total) by mouth once daily.    isosorbide mononitrate (IMDUR) 30 MG 24 hr tablet Take 1 tablet (30 mg total) by mouth once daily.    LIDOcaine (LIDODERM) 5 % Place 1 patch onto the skin once daily. Remove & Discard patch within 12 hours or as directed by MD    metFORMIN (GLUCOPHAGE) 500 MG tablet TAKE 1 TABLET(500 MG) BY MOUTH TWICE DAILY WITH MEALS    metoprolol succinate (TOPROL-XL) 50 MG 24 hr tablet Take 1 tablet (50 mg total) by mouth once daily.    omeprazole (PRILOSEC) 40 MG capsule Take 1 capsule (40 mg total) by mouth once daily.    rosuvastatin (CRESTOR) 40 MG Tab Take 1 tablet (40 mg total) by mouth every evening.    vitamin D (VITAMIN D3) 1000 units Tab Take 1,000 Units by mouth once daily.     Family History       Problem Relation (Age of Onset)    Cancer Mother, Father    Heart disease Father, Paternal Grandmother    Hypertension Mother          Tobacco Use    Smoking status: Current Every Day Smoker     Packs/day: 1.00     Years: 39.00     Pack years: 39.00     Types: Cigarettes    Smokeless tobacco: Never Used   Substance and Sexual Activity    Alcohol use: Yes     Comment: occ    Drug use: No    Sexual activity: Yes     Partners: Female     Review of Systems   Constitutional:  Negative for  diaphoresis, fatigue, fever and unexpected weight change.   HENT:  Negative for sore throat, trouble swallowing and voice change.    Eyes:  Negative for photophobia and visual disturbance.   Respiratory:  Positive for shortness of breath. Negative for cough and wheezing.    Cardiovascular:  Negative for chest pain, palpitations and leg swelling.   Gastrointestinal:  Negative for abdominal pain, constipation, diarrhea, nausea and vomiting.   Endocrine: Negative for polydipsia, polyphagia and polyuria.   Genitourinary:  Negative for dysuria, flank pain and frequency.   Musculoskeletal:  Negative for arthralgias, back pain and myalgias.   Skin:  Negative for rash and wound.   Neurological:  Negative for dizziness, tremors, syncope, weakness, light-headedness, numbness and headaches.   Hematological:  Negative for adenopathy. Does not bruise/bleed easily.   Psychiatric/Behavioral:  Negative for confusion and dysphoric mood. The patient is not nervous/anxious.    Objective:     Vital Signs (Most Recent):  Temp: 97.6 °F (36.4 °C) (07/27/22 0738)  Pulse: 60 (07/27/22 0738)  Resp: 16 (07/27/22 0738)  BP: 128/80 (07/27/22 0738)  SpO2: (!) 93 % (07/27/22 0738)   Vital Signs (24h Range):  Temp:  [97.5 °F (36.4 °C)-98.7 °F (37.1 °C)] 97.6 °F (36.4 °C)  Pulse:  [58-71] 60  Resp:  [16-20] 16  SpO2:  [93 %-97 %] 93 %  BP: (114-139)/(76-92) 128/80     Weight: 93.5 kg (206 lb 2.1 oz)  Body mass index is 29.58 kg/m².    Physical Exam  Vitals and nursing note reviewed.   Constitutional:       Appearance: He is well-developed. He is obese.   HENT:      Head: Normocephalic and atraumatic.   Eyes:      Extraocular Movements: EOM normal.      Pupils: Pupils are equal, round, and reactive to light.   Neck:      Thyroid: No thyromegaly.      Trachea: No tracheal deviation.   Cardiovascular:      Rate and Rhythm: Normal rate and regular rhythm.      Heart sounds: No murmur heard.  Pulmonary:      Effort: Pulmonary effort is normal.       Breath sounds: Normal breath sounds. No wheezing.   Abdominal:      General: Bowel sounds are normal.      Palpations: Abdomen is soft.      Tenderness: There is no abdominal tenderness.   Musculoskeletal:         General: No tenderness. Normal range of motion.      Cervical back: Normal range of motion and neck supple.      Right lower leg: No edema.      Left lower leg: No edema.   Lymphadenopathy:      Cervical: No cervical adenopathy.   Skin:     General: Skin is warm and dry.   Neurological:      Mental Status: He is alert and oriented to person, place, and time.      Cranial Nerves: No cranial nerve deficit.      Deep Tendon Reflexes: Reflexes are normal and symmetric.   Psychiatric:         Behavior: Behavior normal.         CRANIAL NERVES     CN III, IV, VI   Pupils are equal, round, and reactive to light.  Extraocular motions are normal.      Significant Labs: All pertinent labs within the past 24 hours have been reviewed.    Significant Imaging: I have reviewed all pertinent imaging results/findings within the past 24 hours.

## 2022-07-27 NOTE — HPI
Adrian Benton is a 61 y.o. male with HTN, tobacco abuse, AAA, DM who presented to outside facility with 2 day history of intermittent chest pain and SOB. Found to have NSTEMI. Patient was transferred to [facility] for interventional cardiology. He is presently CP free on DAPT, statin, BB, and heparin gtt. NPO for [place] today. HR and BP are controlled.      Recent Diagnostics:     Echo:   Left ventricle: Normal in size with normal systolic function. LV ejection fraction is 55-60%. No regional wall motion abnormalities are evident. Normal LV geometry.  Left ventricular diastolic function: Normal.  Right ventricle: Normal in size with normal systolic function.   Estimated PASP: <40 mmHg normal Estimated RAP: 3 mmHg based on IVC assessment.  Left atrium: Normal in size.  Right atrium: Normal in size  Valves: No clinically significant abnormalities detected.  Aorta: Normal caliber when indexed for BSA  Pericardium: Normal thickness No effusion present.  Technical quality is adequate. challenging due to acoustic windows. Echo contrast was utilized to improve endocardial visualization.  No prior studies available for comparison        [place] 07/25/2022     There is two vessel coronary artery disease.  The Mid Cx to Dist Cx lesion was approximately 95% stenosed.  The distal RPDA lesion was approximately 99% stenosed.  Nonobstructive coronary disease present elsewhere.  Left ventricular end diastolic pressure was 12 mmHg.  Recommend transfer to PCI capable facility for consultation with Interventional Cardiology.

## 2022-07-27 NOTE — ASSESSMENT & PLAN NOTE
Pt on ACS medical therapy  Transferred for PCI    [facility]   There is two vessel coronary artery disease.  · The Mid Cx to Dist Cx lesion was approximately 95% stenosed.  · The distal RPDA lesion was approximately 99% stenosed.  · Nonobstructive coronary disease present elsewhere.  · Left ventricular end diastolic pressure was 12 mmHg.  · Recommend transfer to PCI capable facility for consultation with Interventional Cardiology.

## 2022-07-28 ENCOUNTER — CLINICAL SUPPORT (OUTPATIENT)
Dept: SMOKING CESSATION | Facility: CLINIC | Age: 62
End: 2022-07-28

## 2022-07-28 VITALS
HEART RATE: 64 BPM | DIASTOLIC BLOOD PRESSURE: 69 MMHG | TEMPERATURE: 98 F | SYSTOLIC BLOOD PRESSURE: 107 MMHG | WEIGHT: 206.13 LBS | HEIGHT: 70 IN | RESPIRATION RATE: 23 BRPM | OXYGEN SATURATION: 93 % | BODY MASS INDEX: 29.51 KG/M2

## 2022-07-28 DIAGNOSIS — I25.118 CORONARY ARTERY DISEASE OF NATIVE ARTERY WITH STABLE ANGINA PECTORIS, UNSPECIFIED WHETHER NATIVE OR TRANSPLANTED HEART: Primary | ICD-10-CM

## 2022-07-28 DIAGNOSIS — F17.210 CIGARETTE SMOKER: Primary | ICD-10-CM

## 2022-07-28 LAB
ANION GAP SERPL CALC-SCNC: 14 MMOL/L (ref 8–16)
APTT BLDCRRT: 40.1 SEC (ref 21–32)
BASOPHILS # BLD AUTO: 0.04 K/UL (ref 0–0.2)
BASOPHILS NFR BLD: 0.4 % (ref 0–1.9)
BUN SERPL-MCNC: 12 MG/DL (ref 6–20)
CALCIUM SERPL-MCNC: 9.8 MG/DL (ref 8.7–10.5)
CHLORIDE SERPL-SCNC: 104 MMOL/L (ref 95–110)
CO2 SERPL-SCNC: 20 MMOL/L (ref 23–29)
CREAT SERPL-MCNC: 0.7 MG/DL (ref 0.5–1.4)
DIFFERENTIAL METHOD: ABNORMAL
EOSINOPHIL # BLD AUTO: 0.1 K/UL (ref 0–0.5)
EOSINOPHIL NFR BLD: 1.3 % (ref 0–8)
ERYTHROCYTE [DISTWIDTH] IN BLOOD BY AUTOMATED COUNT: 14.8 % (ref 11.5–14.5)
EST. GFR  (AFRICAN AMERICAN): >60 ML/MIN/1.73 M^2
EST. GFR  (NON AFRICAN AMERICAN): >60 ML/MIN/1.73 M^2
GLUCOSE SERPL-MCNC: 104 MG/DL (ref 70–110)
HCT VFR BLD AUTO: 48.6 % (ref 40–54)
HGB BLD-MCNC: 16.5 G/DL (ref 14–18)
IMM GRANULOCYTES # BLD AUTO: 0.03 K/UL (ref 0–0.04)
IMM GRANULOCYTES NFR BLD AUTO: 0.3 % (ref 0–0.5)
LYMPHOCYTES # BLD AUTO: 1.8 K/UL (ref 1–4.8)
LYMPHOCYTES NFR BLD: 18.2 % (ref 18–48)
MAGNESIUM SERPL-MCNC: 1.9 MG/DL (ref 1.6–2.6)
MCH RBC QN AUTO: 30.4 PG (ref 27–31)
MCHC RBC AUTO-ENTMCNC: 34 G/DL (ref 32–36)
MCV RBC AUTO: 90 FL (ref 82–98)
MONOCYTES # BLD AUTO: 1 K/UL (ref 0.3–1)
MONOCYTES NFR BLD: 9.9 % (ref 4–15)
NEUTROPHILS # BLD AUTO: 6.9 K/UL (ref 1.8–7.7)
NEUTROPHILS NFR BLD: 69.9 % (ref 38–73)
NRBC BLD-RTO: 0 /100 WBC
PLATELET # BLD AUTO: 217 K/UL (ref 150–450)
PMV BLD AUTO: 9.9 FL (ref 9.2–12.9)
POC ACTIVATED CLOTTING TIME K: 376 SEC (ref 74–137)
POCT GLUCOSE: 105 MG/DL (ref 70–110)
POTASSIUM SERPL-SCNC: 3.9 MMOL/L (ref 3.5–5.1)
RBC # BLD AUTO: 5.43 M/UL (ref 4.6–6.2)
SAMPLE: ABNORMAL
SODIUM SERPL-SCNC: 138 MMOL/L (ref 136–145)
WBC # BLD AUTO: 9.83 K/UL (ref 3.9–12.7)

## 2022-07-28 PROCEDURE — 99406 BEHAV CHNG SMOKING 3-10 MIN: CPT | Mod: S$GLB,,,

## 2022-07-28 PROCEDURE — 27000221 HC OXYGEN, UP TO 24 HOURS

## 2022-07-28 PROCEDURE — 99900035 HC TECH TIME PER 15 MIN (STAT)

## 2022-07-28 PROCEDURE — 80048 BASIC METABOLIC PNL TOTAL CA: CPT | Performed by: PHYSICIAN ASSISTANT

## 2022-07-28 PROCEDURE — 99222 1ST HOSP IP/OBS MODERATE 55: CPT | Mod: ,,, | Performed by: NURSE PRACTITIONER

## 2022-07-28 PROCEDURE — 85025 COMPLETE CBC W/AUTO DIFF WBC: CPT | Performed by: NURSE PRACTITIONER

## 2022-07-28 PROCEDURE — 99406 PT REFUSED TOBACCO CESSATION: ICD-10-PCS | Mod: S$GLB,,,

## 2022-07-28 PROCEDURE — 25000003 PHARM REV CODE 250: Performed by: HOSPITALIST

## 2022-07-28 PROCEDURE — 85730 THROMBOPLASTIN TIME PARTIAL: CPT | Performed by: HOSPITALIST

## 2022-07-28 PROCEDURE — 83735 ASSAY OF MAGNESIUM: CPT | Performed by: NURSE PRACTITIONER

## 2022-07-28 PROCEDURE — A4216 STERILE WATER/SALINE, 10 ML: HCPCS | Performed by: NURSE PRACTITIONER

## 2022-07-28 PROCEDURE — 25000003 PHARM REV CODE 250: Performed by: NURSE PRACTITIONER

## 2022-07-28 PROCEDURE — 99222 PR INITIAL HOSPITAL CARE,LEVL II: ICD-10-PCS | Mod: ,,, | Performed by: NURSE PRACTITIONER

## 2022-07-28 PROCEDURE — 94761 N-INVAS EAR/PLS OXIMETRY MLT: CPT

## 2022-07-28 RX ORDER — CLOPIDOGREL BISULFATE 75 MG/1
75 TABLET ORAL DAILY
Qty: 30 TABLET | Refills: 11 | Status: SHIPPED | OUTPATIENT
Start: 2022-07-29 | End: 2023-07-10

## 2022-07-28 RX ADMIN — PANTOPRAZOLE SODIUM 40 MG: 40 TABLET, DELAYED RELEASE ORAL at 08:07

## 2022-07-28 RX ADMIN — MUPIROCIN: 20 OINTMENT TOPICAL at 08:07

## 2022-07-28 RX ADMIN — Medication 10 ML: at 06:07

## 2022-07-28 RX ADMIN — CLOPIDOGREL 75 MG: 75 TABLET, FILM COATED ORAL at 08:07

## 2022-07-28 RX ADMIN — ASPIRIN 81 MG: 81 TABLET, COATED ORAL at 08:07

## 2022-07-28 RX ADMIN — ATORVASTATIN CALCIUM 80 MG: 40 TABLET, FILM COATED ORAL at 08:07

## 2022-07-28 RX ADMIN — METOPROLOL SUCCINATE 50 MG: 50 TABLET, EXTENDED RELEASE ORAL at 08:07

## 2022-07-28 NOTE — PLAN OF CARE
07/28/22 0931   Final Note   Assessment Type Final Discharge Note   Anticipated Discharge Disposition Home   What phone number can be called within the next 1-3 days to see how you are doing after discharge? [phone]   Hospital Resources/Appts/Education Provided Appointments scheduled and added to AVS   Post-Acute Status   Discharge Delays None known at this time

## 2022-07-28 NOTE — SUBJECTIVE & OBJECTIVE
Past Medical History:   Diagnosis Date    Diabetes     Diabetes mellitus, type 2     GERD (gastroesophageal reflux disease)     Hypercholesteremia     Hypertension        Past Surgical History:   Procedure Laterality Date    COLONOSCOPY N/A 7/9/2019    Procedure: COLONOSCOPY;  Surgeon: Luis Bogran-Reyes, MD;  Location: [facility];  Service: Endoscopy;  Laterality: N/A;    CORONARY ANGIOGRAPHY Right 7/25/2022    Procedure: ANGIOGRAM, CORONARY ARTERY;  Surgeon: Tawnya Pruett MD;  Location: [facility] CATH LAB;  Service: Cardiology;  Laterality: Right;    LEFT HEART CATHETERIZATION Left 7/25/2022    Procedure: Left heart cath;  Surgeon: Tawnya Pruett MD;  Location: [facility] CATH LAB;  Service: Cardiology;  Laterality: Left;       Review of patient's allergies indicates:  No Known Allergies    No current facility-administered medications on file prior to encounter.     Current Outpatient Medications on File Prior to Encounter   Medication Sig    aspirin (ECOTRIN) 81 MG EC tablet Take 81 mg by mouth once daily.    diclofenac sodium (VOLTAREN) 1 % Gel Apply 2 g topically 4 (four) times daily.    ezetimibe (ZETIA) 10 mg tablet Take 1 tablet (10 mg total) by mouth once daily.    isosorbide mononitrate (IMDUR) 30 MG 24 hr tablet Take 1 tablet (30 mg total) by mouth once daily.    LIDOcaine (LIDODERM) 5 % Place 1 patch onto the skin once daily. Remove & Discard patch within 12 hours or as directed by MD    metFORMIN (GLUCOPHAGE) 500 MG tablet TAKE 1 TABLET(500 MG) BY MOUTH TWICE DAILY WITH MEALS    metoprolol succinate (TOPROL-XL) 50 MG 24 hr tablet Take 1 tablet (50 mg total) by mouth once daily.    omeprazole (PRILOSEC) 40 MG capsule Take 1 capsule (40 mg total) by mouth once daily.    rosuvastatin (CRESTOR) 40 MG Tab Take 1 tablet (40 mg total) by mouth every evening.    vitamin D (VITAMIN D3) 1000 units Tab Take 1,000 Units by mouth once daily.     Family History       Problem Relation (Age of Onset)    Cancer  Mother, Father    Heart disease Father, Paternal Grandmother    Hypertension Mother          Tobacco Use    Smoking status: Current Every Day Smoker     Packs/day: 1.00     Years: 39.00     Pack years: 39.00     Types: Cigarettes    Smokeless tobacco: Never Used   Substance and Sexual Activity    Alcohol use: Yes     Comment: occ    Drug use: No    Sexual activity: Yes     Partners: Female     Review of Systems   Constitutional: Negative for diaphoresis.   HENT: Negative.     Eyes: Negative.    Cardiovascular:  Negative for chest pain, leg swelling, near-syncope, orthopnea, palpitations, paroxysmal nocturnal dyspnea and syncope.   Respiratory:  Negative for cough and shortness of breath.    Endocrine: Negative.    Hematologic/Lymphatic: Negative.    Skin: Negative.    Musculoskeletal: Negative.    Gastrointestinal:  Negative for nausea and vomiting.   Genitourinary: Negative.    Neurological: Negative.    Psychiatric/Behavioral: Negative.     Allergic/Immunologic: Negative.    Objective:     Vital Signs (Most Recent):  Temp: 98.2 °F (36.8 °C) (07/28/22 0730)  Pulse: 65 (07/28/22 0846)  Resp: 15 (07/28/22 0730)  BP: 121/75 (07/28/22 0846)  SpO2: (!) 93 % (07/28/22 0730) Vital Signs (24h Range):  Temp:  [96.9 °F (36.1 °C)-98.4 °F (36.9 °C)] 98.2 °F (36.8 °C)  Pulse:  [] 65  Resp:  [12-34] 15  SpO2:  [91 %-98 %] 93 %  BP: ()/() 121/75     Weight: 93.5 kg (206 lb 2.1 oz)  Body mass index is 29.58 kg/m².    SpO2: (!) 93 %  O2 Device (Oxygen Therapy): nasal cannula      Intake/Output Summary (Last 24 hours) at 7/28/2022 0857  Last data filed at 7/28/2022 0615  Gross per 24 hour   Intake 236.75 ml   Output 1340 ml   Net -1103.25 ml         Lines/Drains/Airways       Peripheral Intravenous Line  Duration                  Peripheral IV - Single Lumen 07/23/22 1924 20 G Left Antecubital 4 days         Peripheral IV - Single Lumen 07/28/22 0545 20 G Right Wrist <1 day                    Physical  Exam  Constitutional:       General: He is not in acute distress.     Appearance: He is obese. He is not diaphoretic.   HENT:      Head: Atraumatic.   Eyes:      General:         Right eye: No discharge.         Left eye: No discharge.   Cardiovascular:      Rate and Rhythm: Normal rate and regular rhythm.      Heart sounds: No murmur heard.    No friction rub. No gallop.   Pulmonary:      Effort: Pulmonary effort is normal.      Breath sounds: Normal breath sounds.   Abdominal:      General: Bowel sounds are normal.      Palpations: Abdomen is soft.   Musculoskeletal:      Right lower leg: No edema.      Left lower leg: No edema.   Skin:     General: Skin is warm and dry.      Capillary Refill: Capillary refill takes 2 to 3 seconds.   Neurological:      Mental Status: He is alert and oriented to person, place, and time. Mental status is at baseline.   Psychiatric:         Mood and Affect: Mood normal.         Behavior: Behavior normal.         Thought Content: Thought content normal.         Judgment: Judgment normal.       Significant Labs: BMP:   Recent Labs   Lab 07/27/22 0622 07/28/22 0440 07/28/22 0441   GLU 92 104  --     138  --    K 4.1 3.9  --     104  --    CO2 23 20*  --    BUN 14 12  --    CREATININE 0.8 0.7  --    CALCIUM 9.9 9.8  --    MG 2.0  --  1.9     , CMP   Recent Labs   Lab 07/27/22 0622 07/28/22 0440    138   K 4.1 3.9    104   CO2 23 20*   GLU 92 104   BUN 14 12   CREATININE 0.8 0.7   CALCIUM 9.9 9.8   PROT 7.9  --    ALBUMIN 3.8  --    BILITOT 0.4  --    ALKPHOS 96  --    AST 21  --    ALT 25  --    ANIONGAP 10 14   ESTGFRAFRICA >60 >60   EGFRNONAA >60 >60     , CBC   Recent Labs   Lab 07/27/22 0622 07/27/22 0721 07/28/22 0441   WBC 5.82 5.79 9.83   HGB 17.0 16.5 16.5   HCT 50.4 51.0 48.6    214 217     , INR   Recent Labs   Lab 07/27/22 0721   INR 1.1     , Lipid Panel No results for input(s): CHOL, HDL, LDLCALC, TRIG, CHOLHDL in the last 48 hours.,  Troponin No results for input(s): TROPONINI in the last 48 hours., and All pertinent lab results from the last 24 hours have been reviewed.    Significant Imaging: Echocardiogram: Transthoracic echo (TTE) complete (Cupid Only):   Results for orders placed or performed during the hospital encounter of 07/23/22   Echo   Result Value Ref Range    BSA 2.16 m2    TDI SEPTAL 0.06 m/s    LV LATERAL E/E' RATIO 7.67 m/s    LV SEPTAL E/E' RATIO 15.33 m/s    LA WIDTH 4.00 cm    Right Atrial Pressure (from IVC) 3 mmHg    IVC diameter 1.35 cm    RV mid diameter 3.00 cm    QEF 63 %    EF 60 %    Left Ventricular Outflow Tract Mean Velocity 0.7439160521 cm/s    Left Ventricular Outflow Tract Mean Gradient 2.34 mmHg    TDI LATERAL 0.12 m/s    LVIDd 4.71 3.5 - 6.0 cm    IVS 0.79 0.6 - 1.1 cm    Posterior Wall 0.64 0.6 - 1.1 cm    LVIDs 4.24 (A) 2.1 - 4.0 cm    FS 10 28 - 44 %    LA volume 70.21 cm3    STJ 2.97 cm    Ascending aorta 3.06 cm    LV mass 106.25 g    LA size 4.07 cm    RVDD 3.43 cm    TAPSE 1.80 cm    Right ventricular length in diastole (apical 4-chamber view) 6.50 cm    RV S' 11 cm/s    Left Ventricle Relative Wall Thickness 0.27 cm    AV mean gradient 5 mmHg    AV valve area 2.72 cm2    AV Velocity Ratio 0.75     AV index (prosthetic) 0.67     E/A ratio 1.44     Mean e' 0.09 m/s    E wave deceleration time [id number] msec    Pulm vein S/D ratio 2.15     LVOT diameter 2.27 cm    LVOT area 4.0 cm2    LVOT peak collin 1.10 m/s    LVOT peak VTI 23.20 cm    Ao peak collin 1.46 m/s    Ao VTI 34.5 cm    LVOT stroke volume 93.84 cm3    AV peak gradient 9 mmHg    TV rest pulmonary artery pressure 25 mmHg    E/E' ratio 10.22 m/s    MV Peak E Collin 0.92 m/s    TR Max Collin 2.35 m/s    MV Peak A Collin 0.64 m/s    PV Peak S Collin 0.7276374750 m/s    PV Peak D Collin 0.26 m/s    LV Systolic Volume 80.47 mL    LV Systolic Volume Index 37.8 mL/m2    LV Diastolic Volume 102.85 mL    LV Diastolic Volume Index 48.29 mL/m2    LA Volume Index  33.0 mL/m2    LV Mass Index 50 g/m2    RA Major Axis 4.92 cm    Left Atrium Minor Axis 5.00 cm    Left Atrium Major Axis 5.15 cm    Triscuspid Valve Regurgitation Peak Gradient 22 mmHg    LA Volume Index (Mod) 30.5 mL/m2    LA volume (mod) 65.00 cm3    RA Width 3.80 cm    Narrative    1. Left ventricle: Normal in size with normal systolic function. LV   ejection fraction is 55-60%. No regional wall motion abnormalities are   evident. Normal LV geometry.  2. Left ventricular diastolic function: Normal.  3. Right ventricle: Normal in size with normal systolic function.   4. Estimated PASP: <40 mmHg normal Estimated RAP: 3 mmHg based on IVC   assessment.  5. Left atrium: Normal in size.  Right atrium: Normal in size  6. Valves: No clinically significant abnormalities detected.  7. Aorta: Normal caliber when indexed for BSA  8. Pericardium: Normal thickness No effusion present.  9. Technical quality is adequate. challenging due to acoustic windows.   Echo contrast was utilized to improve endocardial visualization.  10. No prior studies available for comparison

## 2022-07-28 NOTE — ASSESSMENT & PLAN NOTE
Echo:   1. Left ventricle: Normal in size with normal systolic function. LV ejection fraction is 55-60%. No regional wall motion abnormalities are evident. Normal LV geometry.  2. Left ventricular diastolic function: Normal.  3. Right ventricle: Normal in size with normal systolic function.   4. Estimated PASP: <40 mmHg normal Estimated RAP: 3 mmHg based on IVC assessment.  5. Left atrium: Normal in size.  Right atrium: Normal in size  6. Valves: No clinically significant abnormalities detected.  7. Aorta: Normal caliber when indexed for BSA  8. Pericardium: Normal thickness No effusion present.  9. Technical quality is adequate. challenging due to acoustic windows. Echo contrast was utilized to improve endocardial visualization.  10. No prior studies available for comparison        [facility] 07/27/2022     Procedure: Coronary angiogram, IVUS guided angioplasty distal OM  Access: Left radial artery  Indication: CP     Findings  Co-dominant  LM: Normal  LAD: LI  RI: LI  LCx: Patent stent; distal % occlusion  RCA: Distal 99% stenosis (small territory distally)     I- Mid LCx stent post dilated with 2.5 NC  - Distal OM angioplasty with 1.5 and 2.0 compliant balloon  - Recovery of distal circulation  - Given exceedingly small vessel diameter and patent distal circulation, decision made not to stent     Recommendation  - Aspirin 81 mg daily for life  - Clopidogrel 75 mg daily for at least 1 year  - High intensity statin  - BB  - Cardiac rehab referral  - Tobacco cessation  - FUP in cardiology clinic.   - PET stress ordered for RCA and if >10% LV myocardium, will consider revascularization as outpatient   - Ambulate in [place], appropriate for DC if no chest pain; discussed with RN

## 2022-07-28 NOTE — DISCHARGE SUMMARY
[place] - Intensive Care  [facility] Medicine  Discharge Summary      Patient Name: Adrian Benton  MRN: [id number]  Patient Class: IP- Inpatient  Admission Date: 7/27/2022  Hospital Length of Stay: 1 days  Discharge Date and Time:  07/28/2022 9:05 AM  Attending Physician: Shaggy Walters MD   Discharging Provider: Romy Jimenez PA-C  Primary Care Provider: Darion Merino II, NP      HPI:   Per  Note: Adrian Benton is a 61-year-old male with a history of abdominal aortic aneurysm, diabetes, gastroesophageal reflux disease, hyperlipidemia, hypertension, coronary artery disease, and tobacco use admitted to [facility] on July 23 with chest pain.  Cardiac enzymes were mildly elevated.  He was admitted with NSTEMI.  He was treated with ACS protocol and taken for coronary angiography on July 25. He was found have 95% high-grade circumflex lesion and 99% high-grade distal PDA lesion.  Recommendation was for transfer to a facility with Interventional capabilities.  Transfer Center spoke with Interventional Cardiology at [facility].  Requesting transfer to Hospital Medicine at [facility] for further treatment.  He is currently on aspirin, Plavix, statin, beta-blocker, and heparin infusion.  Referring provider noted patient has been chest pain-free during his stay.      July 25:  PTT 40.8, magnesium 1.9, sodium 139, potassium 3.9, chloride 105, CO2 23, BUN 13, creatinine 0.8, glucose 105, AST 15, ALT 19, magnesium 3.3, white blood cells 8.47, hemoglobin 16, hematocrit 49.4, platelets 256     July 24:  COVID negative     July 23:  Troponin 0.033, 0.036  -chest x-ray had no acute findings      Procedure(s) (LRB):  Left heart cath (Left)  IVUS, Coronary (Left)  PTCA, Single Vessel (Left)  Stent, Drug Eluting, Single Vessel, Coronary (Left)      Hospital Course:   Pt transferred and admitted for NSTEMI.  Angiogram completed, Mid LCx stent post dilated with 2.5 NC, Distal OM angioplasty with 1.5 and 2.0  compliant balloon.  Cardiology recommendations include asa 81 mg daily for life, plavix daily for at least 1 year, high intensity statin, BB.  Cardiac rehab referral placed and f/u in cardiology clinic placed (consider distal RCA revascularization if recurrent symptoms and >10% LV myocardium.  PET stress ordered as outpatient to evaluate RCA ischemia.  Pt able to ambulate without chest pain.       Goals of Care Treatment Preferences:  Code Status: Full Code      Consults:   Consults (From admission, onward)        Status Ordering Provider     Cardiology-[facility]  Once        Provider:  (Not yet assigned)    Completed HUGO FONG          * NSTEMI (non-ST elevated myocardial infarction)  Pt on ACS medical therapy  Transferred for PCI    [facility]   There is two vessel coronary artery disease.  · The Mid Cx to Dist Cx lesion was approximately 95% stenosed.  · The distal RPDA lesion was approximately 99% stenosed.  · Nonobstructive coronary disease present elsewhere.  · Left ventricular end diastolic pressure was 12 mmHg.  · Recommend transfer to PCI capable facility for consultation with Interventional Cardiology.    Angiogram completed, Mid LCx stent post dilated with 2.5 NC, Distal OM angioplasty with 1.5 and 2.0 compliant balloon.  Cardiology recommendations include asa 81 mg daily for life, plavix daily for at least 1 year, high intensity statin, BB.  Cardiac rehab referral placed and f/u in cardiology clinic placed (consider distal RCA revascularization if recurrent symptoms and >10% LV myocardium.  PET stress ordered as outpatient to evaluate RCA ischemia.    Abdominal aortic aneurysm (AAA) without rupture  Stable  - AAA 43mm on 5/21 - seen by vascular.  - CXR w/o concern for widening mediastinum      Follow with Dr. Lyon  Continue asa, statin and smoking cessation      Essential hypertension  Currently controlled  Continue metoprolol and imdur      Mixed hyperlipidemia  Continue high intensity  statin      Controlled type 2 diabetes mellitus without complication, without long-term current use of insulin  Patient's FSGs are controlled on current medication regimen.  Last A1c reviewed-   Lab Results   Component Value Date    HGBA1C 6.2 (H) 07/27/2022     Most recent fingerstick glucose reviewed-   Recent Labs   Lab 07/27/22  1730 07/27/22  2303 07/28/22  0726   POCTGLUCOSE 96 98 105     Current correctional scale  Low  Maintain anti-hyperglycemic dose as follows-   Antihyperglycemics (From admission, onward)            Start     Stop Route Frequency Ordered    07/27/22 0708  insulin aspart U-100 pen 0-5 Units         -- SubQ Every 6 hours PRN 07/27/22 0708        Hold Oral hypoglycemics while patient is in the hospital.    Tobacco abuse  1.5 ppd >40 yrs now 1ppd  The patient was counseled on the dangers of tobacco use >3 min  Nicotine patch prn        Final Active Diagnoses:    Diagnosis Date Noted POA    PRINCIPAL PROBLEM:  NSTEMI (non-ST elevated myocardial infarction) [I21.4] 07/23/2022 Yes    Abdominal aortic aneurysm (AAA) without rupture [I71.4] 07/29/2021 Yes    Essential hypertension [I10] 01/24/2019 Yes    Mixed hyperlipidemia [E78.2] 07/29/2021 Yes    Controlled type 2 diabetes mellitus without complication, without long-term current use of insulin [E11.9] 01/28/2020 Yes    Tobacco abuse [Z72.0] 01/24/2019 Yes      Problems Resolved During this Admission:       Discharged Condition: good    Disposition: Home or Self Care    Follow Up:   Follow-up Information     Mckenzie Barba MD. Schedule an appointment as soon as possible for a visit.    Specialty: Interventional Cardiology  Why: As needed, If symptoms worsen  Contact information:  [address]  [address]  [place] [address]  [phone]             Darion Merino II, NP Follow up in 1 week(s).    Specialty: Emergency Medicine  Contact information:  [address]  [place] [id number] [phone]                       Patient  Instructions:      Ambulatory referral/consult to Cardiology   Standing Status: Future   Referral Priority: Routine Referral Type: Consultation   Referral Reason: Specialty Services Required   Requested Specialty: Cardiology   Number of Visits Requested: 1     Ambulatory referral/consult to Cardiac Rehab   Standing Status: Future   Referral Priority: Routine Referral Type: Consultation   Referral Reason: Specialty Services Required   Requested Specialty: Cardiac Rehabilitation   Number of Visits Requested: 1     Notify your health care provider if you experience any of the following:  temperature >100.4     Notify your health care provider if you experience any of the following:  persistent nausea and vomiting or diarrhea     Notify your health care provider if you experience any of the following:  severe uncontrolled pain     Notify your health care provider if you experience any of the following:  difficulty breathing or increased cough     Notify your health care provider if you experience any of the following:  severe persistent headache     Notify your health care provider if you experience any of the following:  worsening rash     Notify your health care provider if you experience any of the following:  persistent dizziness, light-headedness, or visual disturbances     Notify your health care provider if you experience any of the following:  increased confusion or weakness       Significant Diagnostic Studies: Labs: All labs within the past 24 hours have been reviewed    Pending Diagnostic Studies:     Procedure Component Value Units Date/Time    APTT [[id number]] Collected: 07/28/22 0440    Order Status: Sent Lab Status: In process Updated: 07/28/22 0441    Specimen: Blood     CBC auto differential [[id number]] Collected: 07/28/22 0440    Order Status: Sent Lab Status: In process Updated: 07/28/22 0441    Specimen: Blood          Medications:  Reconciled Home Medications:      Medication List      START taking  these medications    clopidogreL 75 mg tablet  Commonly known as: PLAVIX  Take 1 tablet (75 mg total) by mouth once daily.  Start taking on: July 29, 2022        CONTINUE taking these medications    aspirin 81 MG EC tablet  Commonly known as: ECOTRIN  Take 81 mg by mouth once daily.     diclofenac sodium 1 % Gel  Commonly known as: VOLTAREN  Apply 2 g topically 4 (four) times daily.     ezetimibe 10 mg tablet  Commonly known as: ZETIA  Take 1 tablet (10 mg total) by mouth once daily.     isosorbide mononitrate 30 MG 24 hr tablet  Commonly known as: IMDUR  Take 1 tablet (30 mg total) by mouth once daily.     LIDOcaine 5 %  Commonly known as: LIDODERM  Place 1 patch onto the skin once daily. Remove & Discard patch within 12 hours or as directed by MD     metFORMIN 500 MG tablet  Commonly known as: GLUCOPHAGE  TAKE 1 TABLET(500 MG) BY MOUTH TWICE DAILY WITH MEALS     metoprolol succinate 50 MG 24 hr tablet  Commonly known as: TOPROL-XL  Take 1 tablet (50 mg total) by mouth once daily.     omeprazole 40 MG capsule  Commonly known as: PRILOSEC  Take 1 capsule (40 mg total) by mouth once daily.     rosuvastatin 40 MG Tab  Commonly known as: CRESTOR  Take 1 tablet (40 mg total) by mouth every evening.     vitamin D 1000 units Tab  Commonly known as: VITAMIN D3  Take 1,000 Units by mouth once daily.            Indwelling Lines/Drains at time of discharge:   Lines/Drains/Airways     None                 Time spent on the discharge of patient: >30 minutes        Romy Jimenez PA-C  Department of Hospital Medicine  [place] - Intensive Care

## 2022-07-28 NOTE — ASSESSMENT & PLAN NOTE
Patient's FSGs are controlled on current medication regimen.  Last A1c reviewed-   Lab Results   Component Value Date    HGBA1C 6.2 (H) 07/27/2022     Most recent fingerstick glucose reviewed-   Recent Labs   Lab 07/27/22  1730 07/27/22  2303 07/28/22  0726   POCTGLUCOSE 96 98 105     Current correctional scale  Low  Maintain anti-hyperglycemic dose as follows-   Antihyperglycemics (From admission, onward)            Start     Stop Route Frequency Ordered    07/27/22 0708  insulin aspart U-100 pen 0-5 Units         -- SubQ Every 6 hours PRN 07/27/22 0708        Hold Oral hypoglycemics while patient is in the hospital.

## 2022-07-28 NOTE — PLAN OF CARE
Problem: Adult Inpatient Plan of Care  Goal: Plan of Care Review  7/28/2022 0150 by July Wakefield RN  Outcome: Ongoing, Progressing   A&Ox4. Vital signs stable. No complaints of pain this shift. Tolerating PO intake. Heparin gtt restarted 6 hrs after removal of sheath & aPTT labs ordered per protocol. Urine output adequate. Repositioned independently with minimal assistance. Safety precautions in place. Plan of care reviewed with patient and spouse.

## 2022-07-28 NOTE — DISCHARGE INSTRUCTIONS
Instruction reviewed with spouse, patient and family, copies given, verbalized understanding instructions.

## 2022-07-28 NOTE — ASSESSMENT & PLAN NOTE
Pt on ACS medical therapy  Transferred for PCI    [facility]   There is two vessel coronary artery disease.  · The Mid Cx to Dist Cx lesion was approximately 95% stenosed.  · The distal RPDA lesion was approximately 99% stenosed.  · Nonobstructive coronary disease present elsewhere.  · Left ventricular end diastolic pressure was 12 mmHg.  · Recommend transfer to PCI capable facility for consultation with Interventional Cardiology.    Angiogram completed, Mid LCx stent post dilated with 2.5 NC, Distal OM angioplasty with 1.5 and 2.0 compliant balloon.  Cardiology recommendations include asa 81 mg daily for life, plavix daily for at least 1 year, high intensity statin, BB.  Cardiac rehab referral placed and f/u in cardiology clinic placed (consider distal RCA revascularization if recurrent symptoms and >10% LV myocardium.  PET stress ordered as outpatient to evaluate RCA ischemia.

## 2022-07-28 NOTE — HOSPITAL COURSE
Pt transferred and admitted for NSTEMI.  Angiogram completed, Mid LCx stent post dilated with 2.5 NC, Distal OM angioplasty with 1.5 and 2.0 compliant balloon.  Cardiology recommendations include asa 81 mg daily for life, plavix daily for at least 1 year, high intensity statin, BB.  Cardiac rehab referral placed and f/u in cardiology clinic placed (consider distal RCA revascularization if recurrent symptoms and >10% LV myocardium.  PET stress ordered as outpatient to evaluate RCA ischemia.  Pt able to ambulate without chest pain.

## 2022-07-28 NOTE — CONSULTS
[facility] Pulmonary Critical Care Consult - Resident Note    Primary Attending Physician: Shaggy Walters MD  Primary Team: [facility]  Consultant Attending: Philippe white MD  Consultant Resident: Ricardo Mccormack MD    Reason for Consult:     NSTEMI    Subjective:      History of Present Illness:  Adrian Benton is a 61 y.o.  male who  has a past medical history of Diabetes mellitus type 2, GERD, Hypercholesteremia, and Hypertension who presented to [facility] on 7/23/22 with chest pain and shortness of breath.  Troponin was elevated and he was admitted for NSTEMI and treated w/ ACS protocol and taken to [facility].  There he was found to have 95% stenosis of the LCx and 99% stenosis of distal PDA.  He was transferred to [facility] for PCI with Dr. Barba.  He had a cath on 7/26/22 at which time he had a stent placed to the mid-distal LCx.  He had a second cath that day for continued chest pain despite stent.  During the second cath he had distal angioplasty without stenting but with recovery of distal circulation.  He currently denies any chest pain or shortness of breath.  He does not have any nausea, vomiting, fever, chills, palpitations, lightheadedness, dizziness, abdominal pain, or other complaints at this time.  He denies any personal history of MI but reports a family history of MI in his father around 70 years of age.  He reports a long history of smoking cigarettes but he quit about 5 years ago.    Past Medical History:  Past Medical History:   Diagnosis Date    Diabetes     Diabetes mellitus, type 2     GERD (gastroesophageal reflux disease)     Hypercholesteremia     Hypertension        Past Surgical History:  Past Surgical History:   Procedure Laterality Date    COLONOSCOPY N/A 7/9/2019    Procedure: COLONOSCOPY;  Surgeon: Luis Bogran-Reyes, MD;  [facility];  Service: Endoscopy;  Laterality: N/A;    CORONARY ANGIOGRAPHY Right 7/25/2022    Procedure: ANGIOGRAM, CORONARY ARTERY;   Surgeon: Tawnya Pruett MD;  Location: [facility] CATH LAB;  Service: Cardiology;  Laterality: Right;    LEFT HEART CATHETERIZATION Left 7/25/2022    Procedure: Left heart cath;  Surgeon: Tawnya Pruett MD;  Location: [facility] CATH LAB;  Service: Cardiology;  Laterality: Left;       Allergies:  Review of patient's allergies indicates:  No Known Allergies    Medications:   In-Hospital Scheduled Medications:   aspirin  81 mg Oral Daily    atorvastatin  80 mg Oral Daily    clopidogreL  75 mg Oral Daily    metoprolol succinate  50 mg Oral Daily    mupirocin   Nasal BID    pantoprazole  40 mg Oral Daily    sodium chloride 0.9%  10 mL Intravenous Q8H      In-Hospital PRN Medications:  sodium chloride 0.9%, acetaminophen, albuterol-ipratropium, dextrose 10%, dextrose 10%, glucagon (human recombinant), glucose, glucose, heparin (porcine), insulin aspart U-100, iodixanoL, melatonin, naloxone, ondansetron, oxyCODONE, polyethylene glycol, sodium chloride 0.9%   In-Hospital IV Infusion Medications:   sodium chloride 0.9% 125 mL/hr (07/27/22 1023)    heparin (porcine) 1,500 Units/hr (07/27/22 1400)      Home Medications:  Prior to Admission medications    Medication Sig Start Date End Date Taking? Authorizing Provider   aspirin (ECOTRIN) 81 MG EC tablet Take 81 mg by mouth once daily.    Historical Provider   clopidogreL (PLAVIX) 75 mg tablet Take 1 tablet (75 mg total) by mouth once daily. 7/29/22 7/29/23  Guy Holt, MAXWELL   diclofenac sodium (VOLTAREN) 1 % Gel Apply 2 g topically 4 (four) times daily. 6/27/22   Historical Provider   ezetimibe (ZETIA) 10 mg tablet Take 1 tablet (10 mg total) by mouth once daily. 11/17/20 6/28/22  JARRETT Perez   isosorbide mononitrate (IMDUR) 30 MG 24 hr tablet Take 1 tablet (30 mg total) by mouth once daily. 4/12/22   Darion Merino II, NP   LIDOcaine (LIDODERM) 5 % Place 1 patch onto the skin once daily. Remove & Discard patch within 12 hours or as  "directed by MD 22   Darion Merino II, NP   metFORMIN (GLUCOPHAGE) 500 MG tablet TAKE 1 TABLET(500 MG) BY MOUTH TWICE DAILY WITH MEALS 22   Darion Merino II, NP   metoprolol succinate (TOPROL-XL) 50 MG 24 hr tablet Take 1 tablet (50 mg total) by mouth once daily. 22   Darion Merino II, NP   omeprazole (PRILOSEC) 40 MG capsule Take 1 capsule (40 mg total) by mouth once daily. 22   Darion Merino II, NP   rosuvastatin (CRESTOR) 40 MG Tab Take 1 tablet (40 mg total) by mouth every evening. 22   Darion Merino II, NP   vitamin D (VITAMIN D3) 1000 units Tab Take 1,000 Units by mouth once daily.    Historical Provider       Family History:  Family History   Problem Relation Age of Onset    Hypertension Mother     Cancer Mother     Heart disease Father     Cancer Father     Heart disease Paternal Grandmother        Social History:  Social History     Tobacco Use    Smoking status: Current Every Day Smoker     Packs/day: 1.00     Years: 39.00     Pack years: 39.00     Types: Cigarettes    Smokeless tobacco: Never Used   Substance Use Topics    Alcohol use: Yes     Comment: occ    Drug use: No       Review of Systems:  Pertinent items are noted in HPI. All other systems are reviewed and are negative.    Objective:   Last 24 Hour Vital Signs:  BP  Min: 86/57  Max: 172/95  Temp  Av.2 °F (36.8 °C)  Min: 98.1 °F (36.7 °C)  Max: 98.4 °F (36.9 °C)  Pulse  Av.6  Min: 53  Max: 102  Resp  Av.8  Min: 7  Max: 39  SpO2  Av.4 %  Min: 91 %  Max: 98 %  Height  Av' 10" (177.8 cm)  Min: 5' 10" (177.8 cm)  Max: 5' 10" (177.8 cm)  Weight  Av.5 kg (206 lb 2.1 oz)  Min: 93.5 kg (206 lb 2.1 oz)  Max: 93.5 kg (206 lb 2.1 oz)  I/O last 3 completed shifts:  In: 236.8 [P.O.:180; I.V.:56.8]  Out: 1340 [Urine:1340]  Body mass index is 29.58 kg/m².    Physical Examination:  General: WDWN. AAOx3. NAD.   HEENT: NCAT. PERRLA. Vision grossly intact. TM clear & intact. " Hearing grossly intact. Nasal turbinates clear. Oropharynx clear. MMM.   Neck: Supple. No JVP/JVD.   CV: RRR. NL S1/S2. No M/R/G. CR <2 secs.   Chest: NL effort. CTAB. No R/R/W. CW NTTP.   Abd: +BS x4. Soft. NT/ND.  Ext: No edema, cyanosis or clubbing. Peripheral pulses intact; bandage over L radial artery access site  Skin: Intact. Overall color, texture & turgor wnl for race & age.    Laboratory Results:  Most Recent Data:  CBC:   Lab Results   Component Value Date    WBC 9.83 07/28/2022    HGB 16.5 07/28/2022    HCT 48.6 07/28/2022     07/28/2022    MCV 90 07/28/2022    RDW 14.8 (H) 07/28/2022     BMP:   Lab Results   Component Value Date     07/28/2022    K 3.9 07/28/2022     07/28/2022    CO2 20 (L) 07/28/2022    BUN 12 07/28/2022    CREATININE 0.7 07/28/2022     07/28/2022    CALCIUM 9.8 07/28/2022    MG 1.9 07/28/2022    PHOS 3.5 07/27/2022     LFTs:   Lab Results   Component Value Date    PROT 7.9 07/27/2022    ALBUMIN 3.8 07/27/2022    BILITOT 0.4 07/27/2022    AST 21 07/27/2022    ALKPHOS 96 07/27/2022    ALT 25 07/27/2022     Coags:   Lab Results   Component Value Date    INR 1.1 07/27/2022     Cardiac:   Lab Results   Component Value Date    TROPONINI 0.036 (H) 07/23/2022    BNP 21 07/23/2022     Urinalysis:   Lab Results   Component Value Date    COLORU Yellow 07/23/2022    SPECGRAV 1.020 07/23/2022    NITRITE Negative 07/23/2022    KETONESU Negative 07/23/2022       Trended Lab Data:  Recent Labs   Lab 07/25/22  0358 07/26/22  0240 07/27/22  0622 07/27/22  0721 07/28/22  0440 07/28/22  0441   WBC 8.47 10.82 5.82 5.79  --  9.83   HGB 16.0 16.3 17.0 16.5  --  16.5   HCT 49.4 50.1 50.4 51.0  --  48.6    241 211 214  --  217   MCV 92 92 90 92  --  90   RDW 15.2* 15.1* 14.7* 14.7*  --  14.8*    138 138  --  138  --    K 3.9 4.0 4.1  --  3.9  --     103 105  --  104  --    CO2 23 24 23  --  20*  --    BUN 13 14 14  --  12  --    CREATININE 0.8 0.9 0.8  --  0.7  --      90 92  --  104  --    PROT 7.3 7.8 7.9  --   --   --    ALBUMIN 3.7 3.9 3.8  --   --   --    BILITOT 0.5 0.6 0.4  --   --   --    AST 15 17 21  --   --   --    ALKPHOS 94 99 96  --   --   --    ALT 19 21 25  --   --   --        Trended Cardiac Data:  Recent Labs   Lab 07/23/22  1933 07/23/22  2236   TROPONINI 0.033* 0.036*   BNP 21  --        Microbiology Data:  none    Other Laboratory Data:  none    Other Results:  EKG (initial on 7/27/22): sinus bradycardia  EKG (repeat on 7/27/22): ST elevations in inferolateral leads    Radiology:  X-Ray Chest AP Portable 7/24/2022- Heart size is normal.  No acute airspace disease or effusion appreciated. Visualized osseous structures appear intact.     Echo 7/25/2022- Left ventricle: Normal in size with normal systolic function. LV ejection fraction is 55-60%. No regional wall motion abnormalities are evident. Normal LV geometry. 2. Left ventricular diastolic function: Normal. 3. Right ventricle: Normal in size with normal systolic function. 4. Estimated PASP: <40 mmHg normal Estimated RAP: 3 mmHg based on IVC assessment. 5. Left atrium: Normal in size.  Right atrium: Normal in size 6. Valves: No clinically significant abnormalities detected. 7. Aorta: Normal caliber when indexed for BSA 8. Pericardium: Normal thickness No effusion present. 9. Technical quality is adequate. challenging due to acoustic windows. Echo contrast was utilized to improve endocardial visualization. 10. No prior studies available for comparison     Cardiac catheterization 7/25/22- There is two vessel coronary artery disease. · The Mid Cx to Dist Cx lesion was approximately 95% stenosed. · The distal RPDA lesion was approximately 99% stenosed. · Nonobstructive coronary disease present elsewhere. · Left ventricular end diastolic pressure was 12 mmHg. · Recommend transfer to PCI capable facility for consultation with Interventional Cardiology.      Cardiac catheterization 7/27/2022- Co-dominant  LM: Normal LAD: LI RI: LI LCx: Mid-distal diffuse disease; distal segment 99% stenosis RCA: Not visualized; known distal 99% stenosis (small territory distally) LVEDP 10 Intevention - IVUS with diffuse disease mid-distal LCx -Status post IVUS guided PCI mid-distal LCx with 3.0 x26 and 2.5 x 18 RAMILA - Post dilated with 2.0, 2.5 and 3.0 NC balloon with good results - Possible distal dissection. Asymptomatic. Medical management     Cardiac catheterization 7/27/2022- Co-dominant LM: Normal LAD: LI RI: LI LCx: Patent stent; distal % occlusion RCA: Distal 99% stenosis (small territory distally) Intervention - Able to cross with work horse wire with knuckle into the distal OM - IVUS with no dissection or thrombus (?spasm) - IC NTG given with improvement - Vessel closure upon removal of wire - Re-wired with knuckle - Mid LCx stent post dilated with 2.5 NC - Distal OM angioplasty with 1.5 and 2.0 compliant balloon - Recovery of distal circulation - Given exceedingly small vessel diameter and patent distal circulation, decision made not to stent     Antibiotics and Day Number of Therapy:  none    Lines and Day Number of Therapy:  none     Assessment:     Adrian Benton is a 61 y.o.  male who  has a past medical history of Diabetes mellitus type 2, GERD, Hypercholesteremia, and Hypertension who [facility]/22 with chest pain and shortness of breath.  Troponin was elevated and he was admitted for NSTEMI and treated w/ ACS protocol and taken to [facility].  There he was found to have 95% stenosis of the LCx and 99% stenosis of distal PDA.  He was transferred to [facility] for PCI with Dr. Barba.  He had a cath on 7/26/22 at which time he had a stent placed to the mid-distal LCx.  He had a second cath that day for continued chest pain despite stent.  During the second cath he had distal angioplasty without stenting but with recovery of distal circulation.      Recommendations:     NSTEMI Type  I  -now s/p stent to mid-distal LCx and balloon angioplasty to distal OM  -Aspirin 81 mg daily for life; Plavix 75 mg daily for at least 1 year  -high intensity statin; BB  -tobacco cessation  -cardiac rehab referral and follow up in cardiology clinic  -PET stress ordered; if >10% LV myocardium effected cards considering revascularization outpatient    DM type II  -last A1c from 7/27/22 was 6.2%  -continue metformin 500 mg BID on discharge    Abdominal aortic aneurysm  -4.3 cm on 5/21; has been seen by vascular  -stable, no acute issues at this time    HTN  -currently well controlled  -continue home Imdur and toprol    HLD  -currently on high intensity statin at home as well as Ezetemibe; continue    GERD  -continue home Prilosec    Ricardo Mccormack  [facility] Internal Medicine [id number]  [facility] Pulmonary Critical Care Service    Pt seen and examined with Pulmonary/Critical Care team and this note reviewed and validated with the following additional comments:    Pain free, no ectopy, no pump dysfunction. Pt counseled about smoking cessation, diet, exercise, and cholesterol control. Should be OK to step down or discharge if OK with Cards.  Needs cardiac rehab.    Jorge L Corea MD  Phone [phone]

## 2022-07-28 NOTE — ASSESSMENT & PLAN NOTE
Stable  - AAA 43mm on 5/21 - seen by vascular.  - CXR w/o concern for widening mediastinum      Follow with Dr. Lyon  Continue asa, statin and smoking cessation

## 2022-07-28 NOTE — PROGRESS NOTES
Smoking cessation education note: Pt is a 1.5 pk/day cigarette smoker x 47 yrs. Pt states switched from cigarettes to [employer] cigars circa 2017. He declines referral to [facility], stating that he will quit on own. Handout provided.

## 2022-07-28 NOTE — PROGRESS NOTES
[facility] - Intensive Care  Cardiology  Progress Note    Patient Name: Adrian Benton  MRN: [id number]  Admission Date: 7/27/2022  Hospital Length of Stay: 1 days  Code Status: Full Code   Attending Physician: Shaggy Walters MD   Primary Care Physician: Darion Merino II, NP  Expected Discharge Date: 7/28/2022  Principal Problem:NSTEMI (non-ST elevated myocardial infarction)    Subjective:     Hospital Course:   07/27/2022   Procedure: Coronary angiogram, IVUS guided angioplasty distal OM  Access: Left radial artery  Indication: CP     Findings  Co-dominant  LM: Normal  LAD: LI  RI: LI  LCx: Patent stent; distal % occlusion  RCA: Distal 99% stenosis (small territory distally)     Intervention  - Able to cross with work horse wire with knuckle into the distal OM  - IVUS with no dissection or thrombus (?spasm)  - IC NTG given with improvement  - Vessel closure upon removal of wire  - Re-wired with knuckle  - Mid LCx stent post dilated with 2.5 NC  - Distal OM angioplasty with 1.5 and 2.0 compliant balloon  - Recovery of distal circulation  - Given exceedingly small vessel diameter and patent distal circulation, decision made not to stent     Recommendation  - Aspirin 81 mg daily for life  - Clopidogrel 75 mg daily for at least 1 year  - High intensity statin  - BB  - Cardiac rehab referral  - Tobacco cessation  - FUP in cardiology clinic. Consider distal RCA revascularization if recurrent symptoms, and >10% LV myocardium    07/28/2022 No recurrent chest pain overnight and did not require any Tridil. Hemodynamically stable. PET stress ordered as outpatient to evaluate RCA ischemia.       Past Medical History:   Diagnosis Date    Diabetes     Diabetes mellitus, type 2     GERD (gastroesophageal reflux disease)     Hypercholesteremia     Hypertension        Past Surgical History:   Procedure Laterality Date    COLONOSCOPY N/A 7/9/2019    Procedure: COLONOSCOPY;  Surgeon: Luis Bogran-Reyes, MD;  Location:  [facility];  Service: Endoscopy;  Laterality: N/A;    CORONARY ANGIOGRAPHY Right 7/25/2022    Procedure: ANGIOGRAM, CORONARY ARTERY;  Surgeon: Tawnya Pruett MD;  Location: [facility] CATH LAB;  Service: Cardiology;  Laterality: Right;    LEFT HEART CATHETERIZATION Left 7/25/2022    Procedure: Left heart cath;  Surgeon: Tawnya Pruett MD;  Location: [facility] CATH LAB;  Service: Cardiology;  Laterality: Left;       Review of patient's allergies indicates:  No Known Allergies    No current facility-administered medications on file prior to encounter.     Current Outpatient Medications on File Prior to Encounter   Medication Sig    aspirin (ECOTRIN) 81 MG EC tablet Take 81 mg by mouth once daily.    diclofenac sodium (VOLTAREN) 1 % Gel Apply 2 g topically 4 (four) times daily.    ezetimibe (ZETIA) 10 mg tablet Take 1 tablet (10 mg total) by mouth once daily.    isosorbide mononitrate (IMDUR) 30 MG 24 hr tablet Take 1 tablet (30 mg total) by mouth once daily.    LIDOcaine (LIDODERM) 5 % Place 1 patch onto the skin once daily. Remove & Discard patch within 12 hours or as directed by MD    metFORMIN (GLUCOPHAGE) 500 MG tablet TAKE 1 TABLET(500 MG) BY MOUTH TWICE DAILY WITH MEALS    metoprolol succinate (TOPROL-XL) 50 MG 24 hr tablet Take 1 tablet (50 mg total) by mouth once daily.    omeprazole (PRILOSEC) 40 MG capsule Take 1 capsule (40 mg total) by mouth once daily.    rosuvastatin (CRESTOR) 40 MG Tab Take 1 tablet (40 mg total) by mouth every evening.    vitamin D (VITAMIN D3) 1000 units Tab Take 1,000 Units by mouth once daily.     Family History       Problem Relation (Age of Onset)    Cancer Mother, Father    Heart disease Father, Paternal Grandmother    Hypertension Mother          Tobacco Use    Smoking status: Current Every Day Smoker     Packs/day: 1.00     Years: 39.00     Pack years: 39.00     Types: Cigarettes    Smokeless tobacco: Never Used   Substance and Sexual Activity    Alcohol  use: Yes     Comment: occ    Drug use: No    Sexual activity: Yes     Partners: Female     Review of Systems   Constitutional: Negative for diaphoresis.   HENT: Negative.     Eyes: Negative.    Cardiovascular:  Negative for chest pain, leg swelling, near-syncope, orthopnea, palpitations, paroxysmal nocturnal dyspnea and syncope.   Respiratory:  Negative for cough and shortness of breath.    Endocrine: Negative.    Hematologic/Lymphatic: Negative.    Skin: Negative.    Musculoskeletal: Negative.    Gastrointestinal:  Negative for nausea and vomiting.   Genitourinary: Negative.    Neurological: Negative.    Psychiatric/Behavioral: Negative.     Allergic/Immunologic: Negative.    Objective:     Vital Signs (Most Recent):  Temp: 98.2 °F (36.8 °C) (07/28/22 0730)  Pulse: 65 (07/28/22 0846)  Resp: 15 (07/28/22 0730)  BP: 121/75 (07/28/22 0846)  SpO2: (!) 93 % (07/28/22 0730) Vital Signs (24h Range):  Temp:  [96.9 °F (36.1 °C)-98.4 °F (36.9 °C)] 98.2 °F (36.8 °C)  Pulse:  [] 65  Resp:  [12-34] 15  SpO2:  [91 %-98 %] 93 %  BP: ()/() 121/75     Weight: 93.5 kg (206 lb 2.1 oz)  Body mass index is 29.58 kg/m².    SpO2: (!) 93 %  O2 Device (Oxygen Therapy): nasal cannula      Intake/Output Summary (Last 24 hours) at 7/28/2022 0857  Last data filed at 7/28/2022 0615  Gross per 24 hour   Intake 236.75 ml   Output 1340 ml   Net -1103.25 ml         Lines/Drains/Airways       Peripheral Intravenous Line  Duration                  Peripheral IV - Single Lumen 07/23/22 1924 20 G Left Antecubital 4 days         Peripheral IV - Single Lumen 07/28/22 0545 20 G Right Wrist <1 day                    Physical Exam  Constitutional:       General: He is not in acute distress.     Appearance: He is obese. He is not diaphoretic.   HENT:      Head: Atraumatic.   Eyes:      General:         Right eye: No discharge.         Left eye: No discharge.   Cardiovascular:      Rate and Rhythm: Normal rate and regular rhythm.       Heart sounds: No murmur heard.    No friction rub. No gallop.   Pulmonary:      Effort: Pulmonary effort is normal.      Breath sounds: Normal breath sounds.   Abdominal:      General: Bowel sounds are normal.      Palpations: Abdomen is soft.   Musculoskeletal:      Right lower leg: No edema.      Left lower leg: No edema.   Skin:     General: Skin is warm and dry.      Capillary Refill: Capillary refill takes 2 to 3 seconds.   Neurological:      Mental Status: He is alert and oriented to person, place, and time. Mental status is at baseline.   Psychiatric:         Mood and Affect: Mood normal.         Behavior: Behavior normal.         Thought Content: Thought content normal.         Judgment: Judgment normal.       Significant Labs: BMP:   Recent Labs   Lab 07/27/22 0622 07/28/22 0440 07/28/22 0441   GLU 92 104  --     138  --    K 4.1 3.9  --     104  --    CO2 23 20*  --    BUN 14 12  --    CREATININE 0.8 0.7  --    CALCIUM 9.9 9.8  --    MG 2.0  --  1.9     , CMP   Recent Labs   Lab 07/27/22 0622 07/28/22 0440    138   K 4.1 3.9    104   CO2 23 20*   GLU 92 104   BUN 14 12   CREATININE 0.8 0.7   CALCIUM 9.9 9.8   PROT 7.9  --    ALBUMIN 3.8  --    BILITOT 0.4  --    ALKPHOS 96  --    AST 21  --    ALT 25  --    ANIONGAP 10 14   ESTGFRAFRICA >60 >60   EGFRNONAA >60 >60     , CBC   Recent Labs   Lab 07/27/22 0622 07/27/22 0721 07/28/22 0441   WBC 5.82 5.79 9.83   HGB 17.0 16.5 16.5   HCT 50.4 51.0 48.6    214 217     , INR   Recent Labs   Lab 07/27/22 0721   INR 1.1     , Lipid Panel No results for input(s): CHOL, HDL, LDLCALC, TRIG, CHOLHDL in the last 48 hours., Troponin No results for input(s): TROPONINI in the last 48 hours., and All pertinent lab results from the last 24 hours have been reviewed.    Significant Imaging: Echocardiogram: Transthoracic echo (TTE) complete ([facility] Only):   Results for orders placed or performed during the hospital encounter of 07/23/22    Echo   Result Value Ref Range    BSA 2.16 m2    TDI SEPTAL 0.06 m/s    LV LATERAL E/E' RATIO 7.67 m/s    LV SEPTAL E/E' RATIO 15.33 m/s    LA WIDTH 4.00 cm    Right Atrial Pressure (from IVC) 3 mmHg    IVC diameter 1.35 cm    RV mid diameter 3.00 cm    QEF 63 %    EF 60 %    Left Ventricular Outflow Tract Mean Velocity 0.0985386108 cm/s    Left Ventricular Outflow Tract Mean Gradient 2.34 mmHg    TDI LATERAL 0.12 m/s    LVIDd 4.71 3.5 - 6.0 cm    IVS 0.79 0.6 - 1.1 cm    Posterior Wall 0.64 0.6 - 1.1 cm    LVIDs 4.24 (A) 2.1 - 4.0 cm    FS 10 28 - 44 %    LA volume 70.21 cm3    STJ 2.97 cm    Ascending aorta 3.06 cm    LV mass 106.25 g    LA size 4.07 cm    RVDD 3.43 cm    TAPSE 1.80 cm    Right ventricular length in diastole (apical 4-chamber view) 6.50 cm    RV S' 11 cm/s    Left Ventricle Relative Wall Thickness 0.27 cm    AV mean gradient 5 mmHg    AV valve area 2.72 cm2    AV Velocity Ratio 0.75     AV index (prosthetic) 0.67     E/A ratio 1.44     Mean e' 0.09 m/s    E wave deceleration time [id number] msec    Pulm vein S/D ratio 2.15     LVOT diameter 2.27 cm    LVOT area 4.0 cm2    LVOT peak collin 1.10 m/s    LVOT peak VTI 23.20 cm    Ao peak collin 1.46 m/s    Ao VTI 34.5 cm    LVOT stroke volume 93.84 cm3    AV peak gradient 9 mmHg    TV rest pulmonary artery pressure 25 mmHg    E/E' ratio 10.22 m/s    MV Peak E Collin 0.92 m/s    TR Max Collin 2.35 m/s    MV Peak A Collin 0.64 m/s    PV Peak S Collin 0.3356501709 m/s    PV Peak D Collin 0.26 m/s    LV Systolic Volume 80.47 mL    LV Systolic Volume Index 37.8 mL/m2    LV Diastolic Volume 102.85 mL    LV Diastolic Volume Index 48.29 mL/m2    LA Volume Index 33.0 mL/m2    LV Mass Index 50 g/m2    RA Major Axis 4.92 cm    Left Atrium Minor Axis 5.00 cm    Left Atrium Major Axis 5.15 cm    Triscuspid Valve Regurgitation Peak Gradient 22 mmHg    LA Volume Index (Mod) 30.5 mL/m2    LA volume (mod) 65.00 cm3    RA Width 3.80 cm    Narrative    1. Left ventricle: Normal in  size with normal systolic function. LV   ejection fraction is 55-60%. No regional wall motion abnormalities are   evident. Normal LV geometry.  2. Left ventricular diastolic function: Normal.  3. Right ventricle: Normal in size with normal systolic function.   4. Estimated PASP: <40 mmHg normal Estimated RAP: 3 mmHg based on IVC   assessment.  5. Left atrium: Normal in size.  Right atrium: Normal in size  6. Valves: No clinically significant abnormalities detected.  7. Aorta: Normal caliber when indexed for BSA  8. Pericardium: Normal thickness No effusion present.  9. Technical quality is adequate. challenging due to acoustic windows.   Echo contrast was utilized to improve endocardial visualization.  10. No prior studies available for comparison       Assessment and Plan:     Brief HPI: Patient seen this morning on rounds without cardiac complaint. Compliance with medication was discussed with particular attention paid to importance of  DAPT for one year without interruption. The risk of abrupt stent occlusion and MI with early discontinuation was specifically stressed. Verbalized understanding and will follow up in the cardiology clinic in 2-3 weeks     * NSTEMI (non-ST elevated myocardial infarction)  Echo:   1. Left ventricle: Normal in size with normal systolic function. LV ejection fraction is 55-60%. No regional wall motion abnormalities are evident. Normal LV geometry.  2. Left ventricular diastolic function: Normal.  3. Right ventricle: Normal in size with normal systolic function.   4. Estimated PASP: <40 mmHg normal Estimated RAP: 3 mmHg based on IVC assessment.  5. Left atrium: Normal in size.  Right atrium: Normal in size  6. Valves: No clinically significant abnormalities detected.  7. Aorta: Normal caliber when indexed for BSA  8. Pericardium: Normal thickness No effusion present.  9. Technical quality is adequate. challenging due to acoustic windows. Echo contrast was utilized to improve endocardial  visualization.  10. No prior studies available for comparison        [facility] 07/27/2022     Procedure: Coronary angiogram, IVUS guided angioplasty distal OM  Access: Left radial artery  Indication: CP     Findings  Co-dominant  LM: Normal  LAD: LI  RI: LI  LCx: Patent stent; distal % occlusion  RCA: Distal 99% stenosis (small territory distally)     I- Mid LCx stent post dilated with 2.5 NC  - Distal OM angioplasty with 1.5 and 2.0 compliant balloon  - Recovery of distal circulation  - Given exceedingly small vessel diameter and patent distal circulation, decision made not to stent     Recommendation  - Aspirin 81 mg daily for life  - Clopidogrel 75 mg daily for at least 1 year  - High intensity statin  - BB  - Cardiac rehab referral  - Tobacco cessation  - FUP in cardiology clinic.   - PET stress ordered for RCA and if >10% LV myocardium, will consider revascularization as outpatient   - Ambulate in [place], appropriate for DC if no chest pain; discussed with RN       Mixed hyperlipidemia  Continue statin     Abdominal aortic aneurysm (AAA) without rupture  03/2022 US- Infrarenal abdominal aortic aneurysm measuring up to 5 cm in diameter (previously 4.8 cm in diameter on 04/15/2021).  Followed by Dr Lyon  Continue asa, statin, smoking cessation     Controlled type 2 diabetes mellitus without complication, without long-term current use of insulin  AccuCkecks AC/HS with SSI    Tobacco abuse  Smoking cessation     Essential hypertension  SBP 110s-120s  Continue BB        VTE Risk Mitigation (From admission, onward)         Ordered     heparin infusion 1,000 units/500 ml in 0.9% NaCl (pressure line flush)  Intra-op continuous PRN         07/27/22 1023     IP VTE HIGH RISK PATIENT  Once         07/27/22 0325     Place sequential compression device  Until discontinued         07/27/22 0325                Guy Holt NP  Cardiology  [facility] - Intensive Care

## 2022-08-12 ENCOUNTER — OFFICE VISIT (OUTPATIENT)
Dept: CARDIOLOGY | Facility: CLINIC | Age: 62
End: 2022-08-12
Payer: MEDICAID

## 2022-08-12 VITALS
WEIGHT: 206.38 LBS | HEIGHT: 70 IN | HEART RATE: 68 BPM | OXYGEN SATURATION: 96 % | SYSTOLIC BLOOD PRESSURE: 113 MMHG | DIASTOLIC BLOOD PRESSURE: 74 MMHG | BODY MASS INDEX: 29.54 KG/M2

## 2022-08-12 DIAGNOSIS — I21.4 NSTEMI (NON-ST ELEVATED MYOCARDIAL INFARCTION): ICD-10-CM

## 2022-08-12 DIAGNOSIS — I71.40 ABDOMINAL AORTIC ANEURYSM (AAA) WITHOUT RUPTURE: ICD-10-CM

## 2022-08-12 DIAGNOSIS — Z72.0 TOBACCO ABUSE: ICD-10-CM

## 2022-08-12 DIAGNOSIS — E78.00 PURE HYPERCHOLESTEROLEMIA: ICD-10-CM

## 2022-08-12 DIAGNOSIS — R07.89 OTHER CHEST PAIN: ICD-10-CM

## 2022-08-12 DIAGNOSIS — E11.9 CONTROLLED TYPE 2 DIABETES MELLITUS WITHOUT COMPLICATION, WITHOUT LONG-TERM CURRENT USE OF INSULIN: ICD-10-CM

## 2022-08-12 DIAGNOSIS — E78.2 MIXED HYPERLIPIDEMIA: Primary | ICD-10-CM

## 2022-08-12 DIAGNOSIS — I10 ESSENTIAL HYPERTENSION: ICD-10-CM

## 2022-08-12 DIAGNOSIS — I25.10 CORONARY ARTERY DISEASE INVOLVING NATIVE CORONARY ARTERY OF NATIVE HEART WITHOUT ANGINA PECTORIS: ICD-10-CM

## 2022-08-12 PROCEDURE — 3044F PR MOST RECENT HEMOGLOBIN A1C LEVEL <7.0%: ICD-10-PCS | Mod: CPTII,,, | Performed by: INTERNAL MEDICINE

## 2022-08-12 PROCEDURE — 99215 OFFICE O/P EST HI 40 MIN: CPT | Mod: S$PBB,,, | Performed by: INTERNAL MEDICINE

## 2022-08-12 PROCEDURE — 1159F PR MEDICATION LIST DOCUMENTED IN MEDICAL RECORD: ICD-10-PCS | Mod: CPTII,,, | Performed by: INTERNAL MEDICINE

## 2022-08-12 PROCEDURE — 1111F PR DISCHARGE MEDS RECONCILED W/ CURRENT OUTPATIENT MED LIST: ICD-10-PCS | Mod: CPTII,,, | Performed by: INTERNAL MEDICINE

## 2022-08-12 PROCEDURE — 3008F PR BODY MASS INDEX (BMI) DOCUMENTED: ICD-10-PCS | Mod: CPTII,,, | Performed by: INTERNAL MEDICINE

## 2022-08-12 PROCEDURE — 3072F LOW RISK FOR RETINOPATHY: CPT | Mod: CPTII,,, | Performed by: INTERNAL MEDICINE

## 2022-08-12 PROCEDURE — 3072F PR LOW RISK FOR RETINOPATHY: ICD-10-PCS | Mod: CPTII,,, | Performed by: INTERNAL MEDICINE

## 2022-08-12 PROCEDURE — 1111F DSCHRG MED/CURRENT MED MERGE: CPT | Mod: CPTII,,, | Performed by: INTERNAL MEDICINE

## 2022-08-12 PROCEDURE — 3074F PR MOST RECENT SYSTOLIC BLOOD PRESSURE < 130 MM HG: ICD-10-PCS | Mod: CPTII,,, | Performed by: INTERNAL MEDICINE

## 2022-08-12 PROCEDURE — 99999 PR PBB SHADOW E&M-EST. PATIENT-LVL IV: CPT | Mod: PBBFAC,,, | Performed by: INTERNAL MEDICINE

## 2022-08-12 PROCEDURE — 3078F DIAST BP <80 MM HG: CPT | Mod: CPTII,,, | Performed by: INTERNAL MEDICINE

## 2022-08-12 PROCEDURE — 3008F BODY MASS INDEX DOCD: CPT | Mod: CPTII,,, | Performed by: INTERNAL MEDICINE

## 2022-08-12 PROCEDURE — 3078F PR MOST RECENT DIASTOLIC BLOOD PRESSURE < 80 MM HG: ICD-10-PCS | Mod: CPTII,,, | Performed by: INTERNAL MEDICINE

## 2022-08-12 PROCEDURE — 99214 OFFICE O/P EST MOD 30 MIN: CPT | Mod: PBBFAC | Performed by: INTERNAL MEDICINE

## 2022-08-12 PROCEDURE — 1160F PR REVIEW ALL MEDS BY PRESCRIBER/CLIN PHARMACIST DOCUMENTED: ICD-10-PCS | Mod: CPTII,,, | Performed by: INTERNAL MEDICINE

## 2022-08-12 PROCEDURE — 99215 PR OFFICE/OUTPT VISIT, EST, LEVL V, 40-54 MIN: ICD-10-PCS | Mod: S$PBB,,, | Performed by: INTERNAL MEDICINE

## 2022-08-12 PROCEDURE — 99999 PR PBB SHADOW E&M-EST. PATIENT-LVL IV: ICD-10-PCS | Mod: PBBFAC,,, | Performed by: INTERNAL MEDICINE

## 2022-08-12 PROCEDURE — 3044F HG A1C LEVEL LT 7.0%: CPT | Mod: CPTII,,, | Performed by: INTERNAL MEDICINE

## 2022-08-12 PROCEDURE — 1160F RVW MEDS BY RX/DR IN RCRD: CPT | Mod: CPTII,,, | Performed by: INTERNAL MEDICINE

## 2022-08-12 PROCEDURE — 1159F MED LIST DOCD IN RCRD: CPT | Mod: CPTII,,, | Performed by: INTERNAL MEDICINE

## 2022-08-12 PROCEDURE — 3074F SYST BP LT 130 MM HG: CPT | Mod: CPTII,,, | Performed by: INTERNAL MEDICINE

## 2022-08-12 RX ORDER — EZETIMIBE 10 MG/1
10 TABLET ORAL DAILY
Qty: 90 TABLET | Refills: 4 | Status: SHIPPED | OUTPATIENT
Start: 2022-08-12 | End: 2023-11-01

## 2022-08-12 RX ORDER — NITROGLYCERIN 0.4 MG/1
0.4 TABLET SUBLINGUAL EVERY 5 MIN PRN
Qty: 30 TABLET | Refills: 3 | Status: SHIPPED | OUTPATIENT
Start: 2022-08-12 | End: 2023-07-10 | Stop reason: SDUPTHER

## 2022-08-12 NOTE — ASSESSMENT & PLAN NOTE
Overall EF normal at time of MI.  aspirin and Plavix compliance confirmed.  No post infarct angina thus far.

## 2022-08-12 NOTE — ASSESSMENT & PLAN NOTE
The patient was not on antihypertensive therapy pre admission.  He is on nitrates and beta-blockers tolerated well.  Blood pressure today normal.

## 2022-08-12 NOTE — ASSESSMENT & PLAN NOTE
There is high-grade distal posterior descending artery stenosis of right coronary artery.  Nuclear stress test ordered today.  He will continue beta-blockers and nitrates.

## 2022-08-12 NOTE — ASSESSMENT & PLAN NOTE
He has been compliant with high-dose Crestor 40 mg. He had been on Zetia which is resumed today.  LDL on Crestor only 108 mg% pure hypercholesterolemia.

## 2022-08-12 NOTE — PROGRESS NOTES
[facility]     Subjective:    Patient ID:  Adrian Benton is a 61 y.o. male who presents for follow-up of Coronary Artery Disease, Diabetes Mellitus, and Hyperlipidemia    Review of patient's allergies indicates:  No Known Allergies   He was recently treated at [place] for NSTEMI. A heart catheterization was done locally and he transferred for intervention.  There was high-grade circumflex disease.  A distal right coronary artery / PDA lesion was also present.  Intervention to the circumflex was successful.  No treatment to the RCA was carried out.  He is on beta-blockers and nitrates.  He has not had any angina.  He is going to return to work next week.  He is a .      He is a smoker.  He has not smoke since he got out of the hospital.  His blood pressures have been stable.  He has had no bruising or bleeding.  He understands and necessity to take aspirin and Plavix.  He is a diabetic and has a strong family history of heart problems.  When he had exertional neck tightness while cutting the grass he knew there was a problem cardiac wise and sought out medical attention.  His right wrist is without complaints today.      Review of Systems   Constitutional: Negative for chills, decreased appetite, diaphoresis, fever, malaise/fatigue, night sweats, weight gain and weight loss.   HENT: Negative for congestion, ear discharge, ear pain, hearing loss, hoarse voice, nosebleeds, odynophagia, sore throat, stridor and tinnitus.    Eyes: Negative for blurred vision, discharge, double vision, pain, photophobia, redness, vision loss in left eye, vision loss in right eye, visual disturbance and visual halos.   Cardiovascular: Negative for chest pain, claudication, cyanosis, dyspnea on exertion, irregular heartbeat, leg swelling, near-syncope, orthopnea, palpitations, paroxysmal nocturnal dyspnea and syncope.   Respiratory:  "Negative for cough, hemoptysis, shortness of breath, sleep disturbances due to breathing, snoring, sputum production and wheezing.    Endocrine: Negative for cold intolerance, heat intolerance, polydipsia, polyphagia and polyuria.   Hematologic/Lymphatic: Negative for adenopathy and bleeding problem. Does not bruise/bleed easily.   Skin: Negative for color change, dry skin, flushing, itching, nail changes, poor wound healing, rash, skin cancer, suspicious lesions and unusual hair distribution.   Musculoskeletal: Negative for arthritis, back pain, falls, gout, joint pain, joint swelling, muscle cramps, muscle weakness, myalgias, neck pain and stiffness.   Gastrointestinal: Negative for bloating, abdominal pain, anorexia, change in bowel habit, bowel incontinence, constipation, diarrhea, dysphagia, excessive appetite, flatus, heartburn, hematemesis, hematochezia, hemorrhoids, jaundice, melena, nausea and vomiting.   Genitourinary: Negative for bladder incontinence, decreased libido, dysuria, flank pain, frequency, genital sores, hematuria, hesitancy, incomplete emptying, nocturia and urgency.   Neurological: Negative for aphonia, brief paralysis, difficulty with concentration, disturbances in coordination, excessive daytime sleepiness, dizziness, focal weakness, headaches, light-headedness, loss of balance, numbness, paresthesias, seizures, sensory change, tremors, vertigo and weakness.   Psychiatric/Behavioral: Negative for altered mental status, depression, hallucinations, memory loss, substance abuse, suicidal ideas and thoughts of violence. The patient does not have insomnia and is not nervous/anxious.    Allergic/Immunologic: Negative for hives and persistent infections.        Objective:       Vitals:    08/12/22 0855   BP: 113/74   Pulse: 68   SpO2: 96%   Weight: 93.6 kg (206 lb 5.6 oz)   Height: 5' 10" (1.778 m)    Physical Exam  Constitutional:       General: He is not in acute distress.     Appearance: He is " well-developed. He is not diaphoretic.   HENT:      Head: Normocephalic and atraumatic.      Nose: Nose normal.   Eyes:      General: No scleral icterus.        Right eye: No discharge.      Conjunctiva/sclera: Conjunctivae normal.      Pupils: Pupils are equal, round, and reactive to light.   Neck:      Thyroid: No thyromegaly.      Vascular: No JVD.      Trachea: No tracheal deviation.   Cardiovascular:      Rate and Rhythm: Normal rate and regular rhythm.      Pulses:           Carotid pulses are 2+ on the right side and 2+ on the left side.       Radial pulses are 2+ on the right side.        Dorsalis pedis pulses are 2+ on the right side and 2+ on the left side.        Posterior tibial pulses are 2+ on the left side.      Heart sounds: Normal heart sounds. No murmur heard.    No friction rub. No gallop.   Pulmonary:      Effort: Pulmonary effort is normal. No respiratory distress.      Breath sounds: Normal breath sounds. No stridor. No wheezing or rales.   Chest:      Chest wall: No tenderness.   Abdominal:      General: Bowel sounds are normal. There is no distension.      Palpations: Abdomen is soft. There is no mass.      Tenderness: There is no abdominal tenderness. There is no right CVA tenderness, guarding or rebound.   Musculoskeletal:         General: No tenderness. Normal range of motion.      Cervical back: Normal range of motion and neck supple.   Lymphadenopathy:      Cervical: No cervical adenopathy.   Skin:     General: Skin is warm and dry.      Coloration: Skin is not pale.      Findings: No erythema or rash.   Neurological:      Mental Status: He is alert and oriented to person, place, and time.      Cranial Nerves: No cranial nerve deficit.      Coordination: Coordination normal.   Psychiatric:         Behavior: Behavior normal.         Thought Content: Thought content normal.         Judgment: Judgment normal.           Assessment:       1. Mixed hyperlipidemia    2. NSTEMI (non-ST elevated  myocardial infarction)    3. Other chest pain    4. Pure hypercholesterolemia    5. Essential hypertension    6. Controlled type 2 diabetes mellitus without complication, without long-term current use of insulin    7. Tobacco abuse    8. Coronary artery disease involving native coronary artery of native heart without angina pectoris    9. Abdominal aortic aneurysm (AAA) without rupture      Results for orders placed or performed during the hospital encounter of 07/27/22   Comprehensive Metabolic Panel (CMP)   Result Value Ref Range    Sodium 138 136 - 145 mmol/L    Potassium 4.1 3.5 - 5.1 mmol/L    Chloride 105 95 - 110 mmol/L    CO2 23 23 - 29 mmol/L    Glucose 92 70 - 110 mg/dL    BUN 14 6 - 20 mg/dL    Creatinine 0.8 0.5 - 1.4 mg/dL    Calcium 9.9 8.7 - 10.5 mg/dL    Total Protein 7.9 6.0 - 8.4 g/dL    Albumin 3.8 3.5 - 5.2 g/dL    Total Bilirubin 0.4 0.1 - 1.0 mg/dL    Alkaline Phosphatase 96 55 - 135 U/L    AST 21 10 - 40 U/L    ALT 25 10 - 44 U/L    Anion Gap 10 8 - 16 mmol/L    eGFR if African American >60 >60 mL/min/1.73 m^2    eGFR if non African American >60 >60 mL/min/1.73 m^2   Magnesium   Result Value Ref Range    Magnesium 2.0 1.6 - 2.6 mg/dL   Phosphorus   Result Value Ref Range    Phosphorus 3.5 2.7 - 4.5 mg/dL   CBC with Automated Differential   Result Value Ref Range    WBC 5.82 3.90 - 12.70 K/uL    RBC 5.63 4.60 - 6.20 M/uL    Hemoglobin 17.0 14.0 - 18.0 g/dL    Hematocrit 50.4 40.0 - 54.0 %    MCV 90 82 - 98 fL    MCH 30.2 27.0 - 31.0 pg    MCHC 33.7 32.0 - 36.0 g/dL    RDW 14.7 (H) 11.5 - 14.5 %    Platelets 211 150 - 450 K/uL    MPV 9.9 9.2 - 12.9 fL    Immature Granulocytes 0.5 0.0 - 0.5 %    Gran # (ANC) 3.6 1.8 - 7.7 K/uL    Immature Grans (Abs) 0.03 0.00 - 0.04 K/uL    Lymph # 1.4 1.0 - 4.8 K/uL    Mono # 0.7 0.3 - 1.0 K/uL    Eos # 0.1 0.0 - 0.5 K/uL    Baso # 0.04 0.00 - 0.20 K/uL    nRBC 0 0 /100 WBC    Gran % 62.6 38.0 - 73.0 %    Lymph % 23.9 18.0 - 48.0 %    Mono % 11.3 4.0 - 15.0 %     Eosinophil % 1.0 0.0 - 8.0 %    Basophil % 0.7 0.0 - 1.9 %    Differential Method Automated    Hemoglobin A1c if not done in past 3 months   Result Value Ref Range    Hemoglobin A1C 6.2 (H) 4.0 - 5.6 %    Estimated Avg Glucose 131 68 - 131 mg/dL   APTT   Result Value Ref Range    aPTT 31.7 21.0 - 32.0 sec   Protime-INR   Result Value Ref Range    Prothrombin Time 11.1 9.0 - 12.5 sec    INR 1.1 0.8 - 1.2   CBC auto differential   Result Value Ref Range    WBC 5.79 3.90 - 12.70 K/uL    RBC 5.55 4.60 - 6.20 M/uL    Hemoglobin 16.5 14.0 - 18.0 g/dL    Hematocrit 51.0 40.0 - 54.0 %    MCV 92 82 - 98 fL    MCH 29.7 27.0 - 31.0 pg    MCHC 32.4 32.0 - 36.0 g/dL    RDW 14.7 (H) 11.5 - 14.5 %    Platelets 214 150 - 450 K/uL    MPV 10.2 9.2 - 12.9 fL    Immature Granulocytes 0.5 0.0 - 0.5 %    Gran # (ANC) 3.6 1.8 - 7.7 K/uL    Immature Grans (Abs) 0.03 0.00 - 0.04 K/uL    Lymph # 1.4 1.0 - 4.8 K/uL    Mono # 0.7 0.3 - 1.0 K/uL    Eos # 0.1 0.0 - 0.5 K/uL    Baso # 0.05 0.00 - 0.20 K/uL    nRBC 0 0 /100 WBC    Gran % 61.5 38.0 - 73.0 %    Lymph % 23.5 18.0 - 48.0 %    Mono % 12.4 4.0 - 15.0 %    Eosinophil % 1.2 0.0 - 8.0 %    Basophil % 0.9 0.0 - 1.9 %    Differential Method Automated    APTT   Result Value Ref Range    aPTT 66.9 (H) 21.0 - 32.0 sec   APTT   Result Value Ref Range    aPTT 29.3 21.0 - 32.0 sec   Magnesium   Result Value Ref Range    Magnesium 1.9 1.6 - 2.6 mg/dL   CBC with Automated Differential   Result Value Ref Range    WBC 9.83 3.90 - 12.70 K/uL    RBC 5.43 4.60 - 6.20 M/uL    Hemoglobin 16.5 14.0 - 18.0 g/dL    Hematocrit 48.6 40.0 - 54.0 %    MCV 90 82 - 98 fL    MCH 30.4 27.0 - 31.0 pg    MCHC 34.0 32.0 - 36.0 g/dL    RDW 14.8 (H) 11.5 - 14.5 %    Platelets 217 150 - 450 K/uL    MPV 9.9 9.2 - 12.9 fL    Immature Granulocytes 0.3 0.0 - 0.5 %    Gran # (ANC) 6.9 1.8 - 7.7 K/uL    Immature Grans (Abs) 0.03 0.00 - 0.04 K/uL    Lymph # 1.8 1.0 - 4.8 K/uL    Mono # 1.0 0.3 - 1.0 K/uL    Eos # 0.1 0.0 - 0.5  K/uL    Baso # 0.04 0.00 - 0.20 K/uL    nRBC 0 0 /100 WBC    Gran % 69.9 38.0 - 73.0 %    Lymph % 18.2 18.0 - 48.0 %    Mono % 9.9 4.0 - 15.0 %    Eosinophil % 1.3 0.0 - 8.0 %    Basophil % 0.4 0.0 - 1.9 %    Differential Method Automated    Basic Metabolic Panel   Result Value Ref Range    Sodium 138 136 - 145 mmol/L    Potassium 3.9 3.5 - 5.1 mmol/L    Chloride 104 95 - 110 mmol/L    CO2 20 (L) 23 - 29 mmol/L    Glucose 104 70 - 110 mg/dL    BUN 12 6 - 20 mg/dL    Creatinine 0.7 0.5 - 1.4 mg/dL    Calcium 9.8 8.7 - 10.5 mg/dL    Anion Gap 14 8 - 16 mmol/L    eGFR if African American >60 >60 mL/min/1.73 m^2    eGFR if non African American >60 >60 mL/min/1.73 m^2   APTT   Result Value Ref Range    aPTT 40.1 (H) 21.0 - 32.0 sec   ISTAT ACT-K   Result Value Ref Range    POC ACTIVATED CLOTTING TIME K 126 74 - 137 sec    Sample ARTERIAL    ISTAT ACT-K   Result Value Ref Range    POC ACTIVATED CLOTTING TIME K 335 (H) 74 - 137 sec    Sample ARTERIAL    ISTAT ACT-K   Result Value Ref Range    POC ACTIVATED CLOTTING TIME K 213 (H) 74 - 137 sec    Sample ARTERIAL    ISTAT ACT-K   Result Value Ref Range    POC ACTIVATED CLOTTING TIME K 179 (H) 74 - 137 sec    Sample ARTERIAL    ISTAT ACT-K   Result Value Ref Range    POC ACTIVATED CLOTTING TIME K 289 (H) 74 - 137 sec    Sample ARTERIAL    ISTAT ACT-K   Result Value Ref Range    POC ACTIVATED CLOTTING TIME K 213 (H) 74 - 137 sec    Sample ARTERIAL    ISTAT ACT-K   Result Value Ref Range    POC ACTIVATED CLOTTING TIME K 265 (H) 74 - 137 sec    Sample ARTERIAL    ISTAT ACT-K   Result Value Ref Range    POC ACTIVATED CLOTTING TIME K 242 (H) 74 - 137 sec    Sample ARTERIAL    POCT glucose   Result Value Ref Range    POCT Glucose 96 70 - 110 mg/dL   POCT glucose   Result Value Ref Range    POCT Glucose 98 70 - 110 mg/dL   ISTAT ACT-K   Result Value Ref Range    POC ACTIVATED CLOTTING TIME K 376 (H) 74 - 137 sec    Sample ARTERIAL    POCT glucose   Result Value Ref Range    POCT  Glucose 105 70 - 110 mg/dL         Current Outpatient Medications:     aspirin (ECOTRIN) 81 MG EC tablet, Take 81 mg by mouth once daily., Disp: , Rfl:     clopidogreL (PLAVIX) 75 mg tablet, Take 1 tablet (75 mg total) by mouth once daily., Disp: 30 tablet, Rfl: 11    isosorbide mononitrate (IMDUR) 30 MG 24 hr tablet, Take 1 tablet (30 mg total) by mouth once daily., Disp: 90 tablet, Rfl: 1    metFORMIN (GLUCOPHAGE) 500 MG tablet, TAKE 1 TABLET(500 MG) BY MOUTH TWICE DAILY WITH MEALS, Disp: 180 tablet, Rfl: 1    metoprolol succinate (TOPROL-XL) 50 MG 24 hr tablet, Take 1 tablet (50 mg total) by mouth once daily., Disp: 90 tablet, Rfl: 1    omeprazole (PRILOSEC) 40 MG capsule, Take 1 capsule (40 mg total) by mouth once daily., Disp: 90 capsule, Rfl: 1    rosuvastatin (CRESTOR) 40 MG Tab, Take 1 tablet (40 mg total) by mouth every evening., Disp: 90 tablet, Rfl: 1    vitamin D (VITAMIN D3) 1000 units Tab, Take 1,000 Units by mouth once daily., Disp: , Rfl:     diclofenac sodium (VOLTAREN) 1 % Gel, Apply 2 g topically 4 (four) times daily., Disp: , Rfl:     ezetimibe (ZETIA) 10 mg tablet, Take 1 tablet (10 mg total) by mouth once daily., Disp: 90 tablet, Rfl: 4    LIDOcaine (LIDODERM) 5 %, Place 1 patch onto the skin once daily. Remove & Discard patch within 12 hours or as directed by MD (Patient not taking: No sig reported), Disp: 15 patch, Rfl: 0    nitroGLYCERIN (NITROSTAT) 0.4 MG SL tablet, Place 1 tablet (0.4 mg total) under the tongue every 5 (five) minutes as needed for Chest pain., Disp: 30 tablet, Rfl: 3     Lab Results   Component Value Date    WBC 9.83 07/28/2022    RBC 5.43 07/28/2022    HGB 16.5 07/28/2022    HCT 48.6 07/28/2022    MCV 90 07/28/2022    MCH 30.4 07/28/2022    MCHC 34.0 07/28/2022    RDW 14.8 (H) 07/28/2022     07/28/2022    MPV 9.9 07/28/2022    GRAN 6.9 07/28/2022    GRAN 69.9 07/28/2022    LYMPH 1.8 07/28/2022    LYMPH 18.2 07/28/2022    MONO 1.0 07/28/2022    MONO 9.9  07/28/2022    EOS 0.1 07/28/2022    BASO 0.04 07/28/2022    EOSINOPHIL 1.3 07/28/2022    BASOPHIL 0.4 07/28/2022    MG 1.9 07/28/2022        CMP  Lab Results   Component Value Date     07/28/2022    K 3.9 07/28/2022     07/28/2022    CO2 20 (L) 07/28/2022     07/28/2022    BUN 12 07/28/2022    CREATININE 0.7 07/28/2022    CALCIUM 9.8 07/28/2022    PROT 7.9 07/27/2022    ALBUMIN 3.8 07/27/2022    BILITOT 0.4 07/27/2022    ALKPHOS 96 07/27/2022    AST 21 07/27/2022    ALT 25 07/27/2022    ANIONGAP 14 07/28/2022    ESTGFRAFRICA >60 07/28/2022    EGFRNONAA >60 07/28/2022        No results found for: LABBLOO, LABURIN, RESPIRATORYC, GSRESP         Results for orders placed or performed during the hospital encounter of 07/27/22   EKG 12-LEAD on arrival to floor    Collection Time: 07/27/22  4:09 PM    Narrative    Test Reason : Z98.62,    Vent. Rate : 056 BPM     Atrial Rate : 056 BPM     P-R Int : 170 ms          QRS Dur : 082 ms      QT Int : 426 ms       P-R-T Axes : 045 014 031 degrees     QTc Int : 411 ms    Sinus bradycardia  ST elevation consider inferior injury or acute infarct    ACUTE MI / STEMI    Abnormal ECG  When compared with ECG of 27-JUL-2022 13:36,  No significant change was found  Confirmed by Arnold Leal MD ([id number]) on 7/28/2022 8:19:50 AM    Referred By: THEODORE MAN           Confirmed By:Arnold Leal MD        CARDIAC MONITORING STRIPS 07/27/2022  Final   CARDIAC MONITORING STRIPS 07/27/2022  Final   CARDIAC MONITORING STRIPS 07/26/2022  Final            Plan:       Problem List Items Addressed This Visit        Cardiac/Vascular    Essential hypertension     The patient was not on antihypertensive therapy pre admission.  He is on nitrates and beta-blockers tolerated well.  Blood pressure today normal.           Pure hypercholesterolemia     He has been compliant with high-dose Crestor 40 mg. He had been on Zetia which is resumed today.  LDL on Crestor only 108 mg% pure  hypercholesterolemia.           Abdominal aortic aneurysm (AAA) without rupture     Repeat ultrasound pending.  Most recent measurement 4.3 cm May 2021.            NSTEMI (non-ST elevated myocardial infarction)     Overall EF normal at time of MI.  aspirin and Plavix compliance confirmed.  No post infarct angina thus far.             Relevant Medications    nitroGLYCERIN (NITROSTAT) 0.4 MG SL tablet    Other Relevant Orders    Nuclear Stress Test    Coronary artery disease involving native coronary artery of native heart without angina pectoris     There is high-grade distal posterior descending artery stenosis of right coronary artery.  Nuclear stress test ordered today.  He will continue beta-blockers and nitrates.           RESOLVED: Mixed hyperlipidemia - Primary    Relevant Medications    ezetimibe (ZETIA) 10 mg tablet    Other Relevant Orders    Lipid Panel    Comprehensive Metabolic Panel       Endocrine    Controlled type 2 diabetes mellitus without complication, without long-term current use of insulin     He is on metformin.  A1c 6.2.  GFR greater than 60. He is protein negative on urinalyses.                Other    Tobacco abuse     He quit smoking post myocardial infarction.             Other Visit Diagnoses     Other chest pain        Relevant Orders    NM Myocardial Perfusion Spect Multi Exer              P.r.n. nitroglycerin ordered.  He knows to have blood work in 6 weeks.  Zetia resumed today.  Current  range on Crestor 40 mg. He is angina free.      A 3 month follow-up was scheduled.  I will give him phone reports of the stress test and lipid profile.  He has quit smoking.  He has a compliant patient. His ejection fraction was normal by echo at time of MI, there was mitral regurgitation but he is asymptomatic.           Arnold Leal MD  08/12/2022   9:31 AM

## 2022-08-26 ENCOUNTER — HOSPITAL ENCOUNTER (OUTPATIENT)
Dept: RADIOLOGY | Facility: HOSPITAL | Age: 62
Discharge: HOME | End: 2022-08-26
Attending: INTERNAL MEDICINE
Payer: MEDICAID

## 2022-08-26 ENCOUNTER — HOSPITAL ENCOUNTER (OUTPATIENT)
Dept: PULMONOLOGY | Facility: HOSPITAL | Age: 62
Discharge: HOME | End: 2022-08-26
Attending: INTERNAL MEDICINE
Payer: MEDICAID

## 2022-08-26 DIAGNOSIS — R07.89 OTHER CHEST PAIN: ICD-10-CM

## 2022-08-26 DIAGNOSIS — I21.4 NSTEMI (NON-ST ELEVATED MYOCARDIAL INFARCTION): ICD-10-CM

## 2022-08-26 PROCEDURE — 93016 CV STRESS TEST SUPVJ ONLY: CPT | Mod: ,,, | Performed by: INTERNAL MEDICINE

## 2022-08-26 PROCEDURE — A9500 TC99M SESTAMIBI: HCPCS

## 2022-08-26 PROCEDURE — 93016 NUCLEAR STRESS TEST (CUPID ONLY): ICD-10-PCS | Mod: ,,, | Performed by: INTERNAL MEDICINE

## 2022-08-26 PROCEDURE — 93017 CV STRESS TEST TRACING ONLY: CPT

## 2022-08-26 PROCEDURE — 93018 CV STRESS TEST I&R ONLY: CPT | Mod: ,,, | Performed by: INTERNAL MEDICINE

## 2022-08-26 PROCEDURE — 93018 NUCLEAR STRESS TEST (CUPID ONLY): ICD-10-PCS | Mod: ,,, | Performed by: INTERNAL MEDICINE

## 2022-08-26 PROCEDURE — 78452 HT MUSCLE IMAGE SPECT MULT: CPT | Mod: 26,,, | Performed by: RADIOLOGY

## 2022-08-26 PROCEDURE — 78452 NM MYOCARDIAL PERFUSION SPECT MULTI STUDY: ICD-10-PCS | Mod: 26,,, | Performed by: RADIOLOGY

## 2022-08-29 ENCOUNTER — TELEPHONE (OUTPATIENT)
Dept: CARDIOLOGY | Facility: CLINIC | Age: 62
End: 2022-08-29
Payer: MEDICAID

## 2022-08-29 NOTE — TELEPHONE ENCOUNTER
I reported stress test results to his wife.  The study is normal.  There is no infarct pattern, no ischemia with normal strength heart.  She will give him the news.

## 2022-08-30 LAB
CV STRESS BASE HR: 61 BPM
DIASTOLIC BLOOD PRESSURE: 95 MMHG
OHS CV CPX 1 MINUTE RECOVERY HEART RATE: 105 BPM
OHS CV CPX 85 PERCENT MAX PREDICTED HEART RATE MALE: 136
OHS CV CPX ESTIMATED METS: 11
OHS CV CPX MAX PREDICTED HEART RATE: 160
OHS CV CPX PATIENT IS FEMALE: 0
OHS CV CPX PATIENT IS MALE: 1
OHS CV CPX PEAK DIASTOLIC BLOOD PRESSURE: 82 MMHG
OHS CV CPX PEAK HEAR RATE: 136 BPM
OHS CV CPX PEAK RATE PRESSURE PRODUCT: NORMAL
OHS CV CPX PEAK SYSTOLIC BLOOD PRESSURE: 178 MMHG
OHS CV CPX PERCENT MAX PREDICTED HEART RATE ACHIEVED: 85
OHS CV CPX RATE PRESSURE PRODUCT PRESENTING: 7869
STRESS ECHO POST EXERCISE DUR MIN: 8 MINUTES
STRESS ECHO POST EXERCISE DUR SEC: 36 SECONDS
SYSTOLIC BLOOD PRESSURE: 129 MMHG

## 2022-12-29 NOTE — Clinical Note
A pulse oximeter was placed on the patient's left index finger and left index finger. Informed patient of ANDRES Harding's note. Patient verbalized understanding and had no questions or concerns at this time.

## 2023-01-05 ENCOUNTER — OFFICE VISIT (OUTPATIENT)
Dept: CARDIOLOGY | Facility: CLINIC | Age: 63
End: 2023-01-05
Payer: MEDICAID

## 2023-01-05 VITALS
WEIGHT: 209 LBS | SYSTOLIC BLOOD PRESSURE: 110 MMHG | HEART RATE: 72 BPM | HEIGHT: 67 IN | BODY MASS INDEX: 32.8 KG/M2 | DIASTOLIC BLOOD PRESSURE: 74 MMHG | OXYGEN SATURATION: 96 %

## 2023-01-05 DIAGNOSIS — E11.9 CONTROLLED TYPE 2 DIABETES MELLITUS WITHOUT COMPLICATION, WITHOUT LONG-TERM CURRENT USE OF INSULIN: ICD-10-CM

## 2023-01-05 DIAGNOSIS — I10 ESSENTIAL HYPERTENSION: Primary | ICD-10-CM

## 2023-01-05 DIAGNOSIS — Z72.0 TOBACCO ABUSE: ICD-10-CM

## 2023-01-05 DIAGNOSIS — E78.00 PURE HYPERCHOLESTEROLEMIA: ICD-10-CM

## 2023-01-05 DIAGNOSIS — I25.10 CORONARY ARTERY DISEASE INVOLVING NATIVE CORONARY ARTERY OF NATIVE HEART WITHOUT ANGINA PECTORIS: ICD-10-CM

## 2023-01-05 DIAGNOSIS — I71.43 INFRARENAL ABDOMINAL AORTIC ANEURYSM (AAA) WITHOUT RUPTURE: ICD-10-CM

## 2023-01-05 DIAGNOSIS — I21.4 NSTEMI (NON-ST ELEVATED MYOCARDIAL INFARCTION): ICD-10-CM

## 2023-01-05 PROCEDURE — 3074F PR MOST RECENT SYSTOLIC BLOOD PRESSURE < 130 MM HG: ICD-10-PCS | Mod: CPTII,,, | Performed by: INTERNAL MEDICINE

## 2023-01-05 PROCEDURE — 1159F MED LIST DOCD IN RCRD: CPT | Mod: CPTII,,, | Performed by: INTERNAL MEDICINE

## 2023-01-05 PROCEDURE — 1159F PR MEDICATION LIST DOCUMENTED IN MEDICAL RECORD: ICD-10-PCS | Mod: CPTII,,, | Performed by: INTERNAL MEDICINE

## 2023-01-05 PROCEDURE — 3078F PR MOST RECENT DIASTOLIC BLOOD PRESSURE < 80 MM HG: ICD-10-PCS | Mod: CPTII,,, | Performed by: INTERNAL MEDICINE

## 2023-01-05 PROCEDURE — 99999 PR PBB SHADOW E&M-EST. PATIENT-LVL III: CPT | Mod: PBBFAC,,, | Performed by: INTERNAL MEDICINE

## 2023-01-05 PROCEDURE — 99214 OFFICE O/P EST MOD 30 MIN: CPT | Mod: S$PBB,,, | Performed by: INTERNAL MEDICINE

## 2023-01-05 PROCEDURE — 3008F PR BODY MASS INDEX (BMI) DOCUMENTED: ICD-10-PCS | Mod: CPTII,,, | Performed by: INTERNAL MEDICINE

## 2023-01-05 PROCEDURE — 3074F SYST BP LT 130 MM HG: CPT | Mod: CPTII,,, | Performed by: INTERNAL MEDICINE

## 2023-01-05 PROCEDURE — 3008F BODY MASS INDEX DOCD: CPT | Mod: CPTII,,, | Performed by: INTERNAL MEDICINE

## 2023-01-05 PROCEDURE — 99999 PR PBB SHADOW E&M-EST. PATIENT-LVL III: ICD-10-PCS | Mod: PBBFAC,,, | Performed by: INTERNAL MEDICINE

## 2023-01-05 PROCEDURE — 3078F DIAST BP <80 MM HG: CPT | Mod: CPTII,,, | Performed by: INTERNAL MEDICINE

## 2023-01-05 PROCEDURE — 99214 PR OFFICE/OUTPT VISIT, EST, LEVL IV, 30-39 MIN: ICD-10-PCS | Mod: S$PBB,,, | Performed by: INTERNAL MEDICINE

## 2023-01-05 PROCEDURE — 99213 OFFICE O/P EST LOW 20 MIN: CPT | Mod: PBBFAC | Performed by: INTERNAL MEDICINE

## 2023-01-05 NOTE — PROGRESS NOTES
Cardiology Clinic note    Subjective:   Patient ID:  Adrian Benton is a 62 y.o. male who presents for follow-up of CAD, DM, HLP    HPI:   Adrian Benton  has a past medical history of Diabetes, Diabetes mellitus, type 2, GERD (gastroesophageal reflux disease), Hypercholesteremia, and Hypertension.      Had NSTEMI 7/2022. LHC was present with high-grade circumflex disease.  A distal right coronary artery / PDA lesion was also present.  Intervention to the circumflex was successful.  No treatment to the RCA was carried out.  He is on beta-blockers and nitrates.  He has not had any angina.  He is a .     Continues to smoke. His blood pressures have been stable.  He has had no bruising or bleeding. Reports medication compliance with ASA/plavix, however reports he is sometimes forgetful with cholesterol medications. He is a diabetic and has a strong family history of heart problems.     Denies CP, SOB/GRIGGS, orthopnea, PND, syncope, palps, LE edema.       Patient Active Problem List    Diagnosis Date Noted    Coronary artery disease involving native coronary artery of native heart without angina pectoris 08/12/2022    NSTEMI (non-ST elevated myocardial infarction) 07/23/2022    Coronary artery calcification 07/29/2021    Abdominal aortic aneurysm (AAA) without rupture 07/29/2021    Controlled type 2 diabetes mellitus without complication, without long-term current use of insulin 01/28/2020    History of colon polyps 07/09/2019    Essential hypertension 01/24/2019    Pure hypercholesterolemia 01/24/2019    Tobacco abuse 01/24/2019       Patient's Medications   New Prescriptions    No medications on file   Previous Medications    ASPIRIN (ECOTRIN) 81 MG EC TABLET    Take 81 mg by mouth once daily.    CLOPIDOGREL (PLAVIX) 75 MG TABLET    Take 1 tablet (75 mg total) by mouth once daily.    EZETIMIBE (ZETIA) 10 MG TABLET    Take 1 tablet (10 mg total) by mouth once daily.    ISOSORBIDE  MONONITRATE (IMDUR) 30 MG 24 HR TABLET    TAKE 1 TABLET(30 MG) BY MOUTH EVERY DAY    METFORMIN (GLUCOPHAGE) 500 MG TABLET    TAKE 1 TABLET(500 MG) BY MOUTH TWICE DAILY WITH MEALS    METOPROLOL SUCCINATE (TOPROL-XL) 50 MG 24 HR TABLET    TAKE 1 TABLET(50 MG) BY MOUTH EVERY DAY    MUPIROCIN (BACTROBAN) 2 % OINTMENT    Apply topically 3 (three) times daily.    NITROGLYCERIN (NITROSTAT) 0.4 MG SL TABLET    Place 1 tablet (0.4 mg total) under the tongue every 5 (five) minutes as needed for Chest pain.    OMEPRAZOLE (PRILOSEC) 20 MG CAPSULE    Take 1 capsule (20 mg total) by mouth once daily.    ROSUVASTATIN (CRESTOR) 40 MG TAB    TAKE 1 TABLET(40 MG) BY MOUTH EVERY EVENING    VITAMIN D (VITAMIN D3) 1000 UNITS TAB    Take 1,000 Units by mouth once daily.   Modified Medications    No medications on file   Discontinued Medications    No medications on file        Review of Systems   Constitutional: Negative for chills, decreased appetite, diaphoresis, fever, malaise/fatigue, night sweats, weight gain and weight loss.   HENT:  Negative for congestion, ear discharge, ear pain, hearing loss, hoarse voice, nosebleeds, odynophagia, sore throat, stridor and tinnitus.    Eyes:  Negative for blurred vision, discharge, double vision, pain, photophobia, redness, vision loss in left eye, vision loss in right eye, visual disturbance and visual halos.   Cardiovascular:  Negative for chest pain, claudication, cyanosis, dyspnea on exertion, irregular heartbeat, leg swelling, near-syncope, orthopnea, palpitations, paroxysmal nocturnal dyspnea and syncope.   Respiratory:  Negative for cough, hemoptysis, shortness of breath, sleep disturbances due to breathing, snoring, sputum production and wheezing.    Endocrine: Negative for cold intolerance, heat intolerance, polydipsia, polyphagia and polyuria.   Hematologic/Lymphatic: Negative for adenopathy and bleeding problem. Does not bruise/bleed easily.   Skin:  Negative for color change, dry  "skin, flushing, itching, nail changes, poor wound healing, rash, skin cancer, suspicious lesions and unusual hair distribution.   Musculoskeletal:  Negative for arthritis, back pain, falls, gout, joint pain, joint swelling, muscle cramps, muscle weakness, myalgias, neck pain and stiffness.   Gastrointestinal:  Negative for bloating, abdominal pain, anorexia, change in bowel habit, bowel incontinence, constipation, diarrhea, dysphagia, excessive appetite, flatus, heartburn, hematemesis, hematochezia, hemorrhoids, jaundice, melena, nausea and vomiting.   Genitourinary:  Negative for bladder incontinence, decreased libido, dysuria, flank pain, frequency, genital sores, hematuria, hesitancy, incomplete emptying, nocturia and urgency.   Neurological:  Negative for aphonia, brief paralysis, difficulty with concentration, disturbances in coordination, excessive daytime sleepiness, dizziness, focal weakness, headaches, light-headedness, loss of balance, numbness, paresthesias, seizures, sensory change, tremors, vertigo and weakness.   Psychiatric/Behavioral:  Negative for altered mental status, depression, hallucinations, memory loss, substance abuse, suicidal ideas and thoughts of violence. The patient does not have insomnia and is not nervous/anxious.    Allergic/Immunologic: Negative for hives and persistent infections.       Objective:   Vitals  Vitals:    01/05/23 1011   BP: 110/74   Pulse: 72   SpO2: 96%   Weight: 94.8 kg (208 lb 15.9 oz)   Height: 5' 7" (1.702 m)          Physical Exam  Constitutional:       General: He is not in acute distress.     Appearance: He is well-developed. He is not diaphoretic.   HENT:      Head: Normocephalic and atraumatic.      Nose: Nose normal.   Eyes:      General: No scleral icterus.        Right eye: No discharge.      Conjunctiva/sclera: Conjunctivae normal.      Pupils: Pupils are equal, round, and reactive to light.   Neck:      Thyroid: No thyromegaly.      Vascular: No JVD.    "   Trachea: No tracheal deviation.   Cardiovascular:      Rate and Rhythm: Normal rate and regular rhythm.      Pulses:           Carotid pulses are 2+ on the right side and 2+ on the left side.       Radial pulses are 2+ on the right side.        Dorsalis pedis pulses are 2+ on the right side and 2+ on the left side.        Posterior tibial pulses are 2+ on the left side.      Heart sounds: Normal heart sounds. No murmur heard.    No friction rub. No gallop.   Pulmonary:      Effort: Pulmonary effort is normal. No respiratory distress.      Breath sounds: Normal breath sounds. No stridor. No wheezing or rales.   Chest:      Chest wall: No tenderness.   Abdominal:      General: Bowel sounds are normal. There is no distension.      Palpations: Abdomen is soft. There is no mass.      Tenderness: There is no abdominal tenderness. There is no right CVA tenderness, guarding or rebound.   Musculoskeletal:         General: No tenderness. Normal range of motion.      Cervical back: Normal range of motion and neck supple.   Lymphadenopathy:      Cervical: No cervical adenopathy.   Skin:     General: Skin is warm and dry.      Coloration: Skin is not pale.      Findings: No erythema or rash.   Neurological:      Mental Status: He is alert and oriented to person, place, and time.      Cranial Nerves: No cranial nerve deficit.      Coordination: Coordination normal.   Psychiatric:         Behavior: Behavior normal.         Thought Content: Thought content normal.         Judgment: Judgment normal.         Lab Results    Lab Results   Component Value Date    WBC 8.32 10/14/2022    HGB 14.7 10/14/2022    HCT 45.5 10/14/2022    MCV 93 10/14/2022       Lab Results   Component Value Date     10/14/2022    INR 1.1 07/27/2022       Lab Results   Component Value Date    K 5.2 (H) 10/14/2022    MG 1.9 07/28/2022    BUN 7 10/14/2022    CREATININE 0.8 10/14/2022       Lab Results   Component Value Date     (H) 10/14/2022     HGBA1C 6.6 (H) 10/14/2022       Lab Results   Component Value Date    AST 17 10/14/2022    ALT 24 10/14/2022    ALBUMIN 3.8 10/14/2022    PROT 7.0 10/14/2022       Lab Results   Component Value Date    CHOL 204 (H) 10/14/2022    HDL 38 (L) 10/14/2022    LDLCALC 146.6 10/14/2022    TRIG 97 10/14/2022       Lab Results   Component Value Date    BNP 21 07/23/2022       Cardiac Studies  Significant Imaging:     Echo:   Left ventricle: Normal in size with normal systolic function. LV ejection fraction is 55-60%. No regional wall motion abnormalities are evident. Normal LV geometry.  Left ventricular diastolic function: Normal.  Right ventricle: Normal in size with normal systolic function.   Estimated PASP: <40 mmHg normal Estimated RAP: 3 mmHg based on IVC assessment.  Left atrium: Normal in size.  Right atrium: Normal in size  Valves: No clinically significant abnormalities detected.  Aorta: Normal caliber when indexed for BSA  Pericardium: Normal thickness No effusion present.  Technical quality is adequate. challenging due to acoustic windows. Echo contrast was utilized to improve endocardial visualization.  No prior studies available for comparison    Stress Test:  Impression:     1. Scintigraphically negative for ischemia or infarct.  2. The global left ventricular systolic function is normal with an LV ejection fraction of 66 % and no evidence of LV dilatation. Wall motion is normal.     Cath study: 7/27/2022  Findings  Co-dominant  LM: Normal  LAD: LI  RI: LI  LCx: Patent stent; distal % occlusion  RCA: Distal 99% stenosis (small territory distally)       Assessment:     Problem List Items Addressed This Visit          Cardiac/Vascular    Essential hypertension - Primary    Current Assessment & Plan     At goal  Continue current regimen          Pure hypercholesterolemia    Current Assessment & Plan     Reports of medication noncompliance with Crestor/Zetia due to work schedule   LDL increased from 100s to  140s  Discussed compliance  Will repeat panel at next visit          Abdominal aortic aneurysm (AAA) without rupture    Current Assessment & Plan     Yearly surveillance   Most recent measurement 5.0 cm 12/22  Smoking cessation advised           NSTEMI (non-ST elevated myocardial infarction)    Current Assessment & Plan     Remains on ASA/Plavix   No post infarct angina          Coronary artery disease involving native coronary artery of native heart without angina pectoris    Current Assessment & Plan     Stable  High grade distal PDA stenosis to RCA; remains on BB/nitrates  PCI to circ 7/2022 8/22 nuclear stress test neg for ischemia              Endocrine    Controlled type 2 diabetes mellitus without complication, without long-term current use of insulin    Current Assessment & Plan     A1C 6.6            Other    Tobacco abuse    Current Assessment & Plan     Cessation advised            Plan:       Continue with current medical plan and lifestyle changes.      No orders of the defined types were placed in this encounter.      Follow up in 6 months with [facility]  Return sooner for concerns or questions. If symptoms persist go to the ED    He expressed verbal understanding and agreed with the plan    Thank you for the opportunity to care for this patient. Will be available for questions if needed.     Total duration of face to face visit time 30 minutes.  Total time spent counseling greater than fifty percent of total visit time.  Counseling included discussion regarding imaging findings, diagnosis, possibilities, treatment options, risks and benefits.    Rimma Victoria, JARRETT-C  Cardiology Clinic  [facility]

## 2023-01-05 NOTE — ASSESSMENT & PLAN NOTE
Reports of medication noncompliance with Crestor/Zetia due to work schedule   LDL increased from 100s to 140s  Discussed compliance  Will repeat panel at next visit

## 2023-07-03 ENCOUNTER — OFFICE VISIT (OUTPATIENT)
Dept: ENDOCRINOLOGY | Facility: CLINIC | Age: 63
End: 2023-07-03
Payer: MEDICAID

## 2023-07-03 ENCOUNTER — PATIENT MESSAGE (OUTPATIENT)
Dept: ENDOCRINOLOGY | Facility: CLINIC | Age: 63
End: 2023-07-03

## 2023-07-03 DIAGNOSIS — I71.43 INFRARENAL ABDOMINAL AORTIC ANEURYSM (AAA) WITHOUT RUPTURE: ICD-10-CM

## 2023-07-03 DIAGNOSIS — E11.9 CONTROLLED TYPE 2 DIABETES MELLITUS WITHOUT COMPLICATION, WITHOUT LONG-TERM CURRENT USE OF INSULIN: ICD-10-CM

## 2023-07-03 DIAGNOSIS — D35.02 ADENOMA OF LEFT ADRENAL GLAND: ICD-10-CM

## 2023-07-03 DIAGNOSIS — D35.02 ADRENAL ADENOMA, LEFT: Primary | ICD-10-CM

## 2023-07-03 PROCEDURE — 3044F PR MOST RECENT HEMOGLOBIN A1C LEVEL <7.0%: ICD-10-PCS | Mod: CPTII,95,, | Performed by: INTERNAL MEDICINE

## 2023-07-03 PROCEDURE — 99204 PR OFFICE/OUTPT VISIT, NEW, LEVL IV, 45-59 MIN: ICD-10-PCS | Mod: 95,,, | Performed by: INTERNAL MEDICINE

## 2023-07-03 PROCEDURE — 3044F HG A1C LEVEL LT 7.0%: CPT | Mod: CPTII,95,, | Performed by: INTERNAL MEDICINE

## 2023-07-03 PROCEDURE — 99204 OFFICE O/P NEW MOD 45 MIN: CPT | Mod: 95,,, | Performed by: INTERNAL MEDICINE

## 2023-07-03 RX ORDER — DEXAMETHASONE 1 MG/1
1 TABLET ORAL ONCE
Qty: 1 TABLET | Refills: 0 | Status: SHIPPED | OUTPATIENT
Start: 2023-07-03 | End: 2023-07-03

## 2023-07-03 NOTE — ASSESSMENT & PLAN NOTE
Reviewed incidence.    Lack of significant interval growth over two years along with low precontrast attenuation on CT scan from 2021 is strongly suggestive of a benign adenoma.  Additional dedicated imaging at this time is not needed.    Discussed that indications for surgery are size, concerning characteristics and functionality (pheochromocytoma or cortisol excess).      Plan hormonal testing to include a 1 mg overnight dst, ange renin ratio and plasma metanephrines  (accepting a higher false + rate)

## 2023-07-03 NOTE — PROGRESS NOTES
Adrenal nodule NEW PATIENT - AUDIOVISUAL VIRTUAL VISIT    Subjective:      Chief Complaint: Adrenal incidentaloma.    HPI: Adrian Benton is a 62 y.o. male who is seen for adrenal mass    Past Medical History:   Diagnosis Date    Diabetes     Diabetes mellitus, type 2     GERD (gastroesophageal reflux disease)     Hypercholesteremia     Hypertension          With regards to the adrenal incidentaloma:         Imaging evaluation:    Was found to have an incidental adrenal nodule on CT scan in 4/2021. Low pre-contrast attenuation suggestive of a benign adenoma.         Repeat CT in 3/2023 showed an 18 mm left adrenal nodule;  Couldn't calculate wash out due to contrasted CT. There has been no significant interval growth over that time span.        He is undergoing serial imaging evaluation for AAA.  His neck CT scan is coming up in December, 2023.    Functional evaluation:    Not yet done.    No results found for: LABCORT, CORTISOLSALI, ALDOSTERONE, LABRENI, METANEPHRINE    No results found for: [id number]    No results found for: ACTH, DHEASO4    Lab Results   Component Value Date    K 4.1 04/21/2023    K 4.4 03/22/2023    K 5.2 (H) 10/14/2022    CO2 21 (L) 04/21/2023    CO2 22 (L) 03/22/2023    CO2 23 10/14/2022         Clinical History:    History of hypertension?: Yes    Hypertension Medications    Imdur 30 mg once daily  Toprol-XL 50 mg once daily       No   Yes  [x]    []  History of malignancy  [x]    []  Abdominal discomfort  [x]    []  Paroxysmal symptoms (palpitations, syncope, pallor, dizziness)  []    [x]  Diabetes - metformin daily  [x]    []  Polyuria, polydipsia, nocturia, unexplained weight loss or blurred vision  [x]    []  Easy bruising  [x]    []  Abnormal stretch marks  [x]    []  Proximal muscle weakness   [x]    []  Fractures or osteoporosis        Objective:     BP Readings from Last 5 Encounters:   06/27/23 99/65   04/25/23 100/70   03/22/23 (!) 140/90   01/21/23 135/84   01/05/23 110/74        Physical exam was not performed and vitals were not obtained due to this being a telemedicine (virtual) visit.    Wt Readings from Last 10 Encounters:   06/27/23 0955 96.6 kg (213 lb)   04/25/23 0946 96.6 kg (213 lb)   03/22/23 0814 95.8 kg (211 lb 3.2 oz)   03/22/23 0812 95.8 kg (211 lb 1.5 oz)   01/21/23 0833 94.8 kg (209 lb)   01/05/23 1011 94.8 kg (208 lb 15.9 oz)   12/26/22 0811 97.5 kg (215 lb)   12/13/22 1056 97.6 kg (215 lb 0.9 oz)   10/25/22 0958 97.7 kg (215 lb 6.4 oz)   09/14/22 1227 93 kg (205 lb 0.4 oz)   09/12/22 0943 93 kg (205 lb 0.4 oz)       Assessment/Plan:     Adenoma of left adrenal gland  Reviewed incidence.    Lack of significant interval growth over two years along with low precontrast attenuation on CT scan from 2021 is strongly suggestive of a benign adenoma.  Additional dedicated imaging at this time is not needed.    Discussed that indications for surgery are size, concerning characteristics and functionality (pheochromocytoma or cortisol excess).      Plan hormonal testing to include a 1 mg overnight dst, ange renin ratio and plasma metanephrines  (accepting a higher false + rate)        Controlled type 2 diabetes mellitus without complication, without long-term current use of insulin  Currently well controlled on metformin monotherapy.    Abdominal aortic aneurysm (AAA) without rupture  Following with vascular.  Planning serial imaging.  Next CT scan is scheduled in December, 2023.      The patient location is: home  The chief complaint leading to consultation is: adrenal nodule  Visit type: Virtual visit with synchronous audio and video  Total time spent with patient: 35 mins  Each patient to whom he or she provides medical services by telemedicine is:  (1) informed of the relationship between the physician and patient and the respective role of any other health care provider with respect to management of the patient; and (2) notified that he or she may decline to receive medical  services by telemedicine and may withdraw from such care at any time.    I spent 35 minutes face-to-face with the patient, over half of the visit was spent on counseling and/or coordinating the care of the patient.    Counseling includes:  Diagnostic results, impressions, recommendations   Prognosis   Risk and benefits of management/treatment options   Instructions for management treatment and or follow-up   Importance of compliance with management   Risk factor reduction   Patient education

## 2023-07-10 ENCOUNTER — OFFICE VISIT (OUTPATIENT)
Dept: CARDIOLOGY | Facility: CLINIC | Age: 63
End: 2023-07-10
Payer: MEDICAID

## 2023-07-10 VITALS
OXYGEN SATURATION: 94 % | SYSTOLIC BLOOD PRESSURE: 120 MMHG | DIASTOLIC BLOOD PRESSURE: 82 MMHG | HEART RATE: 66 BPM | BODY MASS INDEX: 32.53 KG/M2 | HEIGHT: 67 IN | WEIGHT: 207.25 LBS | RESPIRATION RATE: 18 BRPM

## 2023-07-10 DIAGNOSIS — E78.2 MIXED HYPERLIPIDEMIA: ICD-10-CM

## 2023-07-10 DIAGNOSIS — E11.9 CONTROLLED TYPE 2 DIABETES MELLITUS WITHOUT COMPLICATION, WITHOUT LONG-TERM CURRENT USE OF INSULIN: ICD-10-CM

## 2023-07-10 DIAGNOSIS — Z72.0 TOBACCO ABUSE: ICD-10-CM

## 2023-07-10 DIAGNOSIS — I21.4 NSTEMI (NON-ST ELEVATED MYOCARDIAL INFARCTION): ICD-10-CM

## 2023-07-10 DIAGNOSIS — I71.43 INFRARENAL ABDOMINAL AORTIC ANEURYSM (AAA) WITHOUT RUPTURE: Primary | ICD-10-CM

## 2023-07-10 DIAGNOSIS — I10 ESSENTIAL HYPERTENSION: ICD-10-CM

## 2023-07-10 DIAGNOSIS — I25.10 CORONARY ARTERY DISEASE INVOLVING NATIVE CORONARY ARTERY OF NATIVE HEART WITHOUT ANGINA PECTORIS: ICD-10-CM

## 2023-07-10 PROCEDURE — 1160F RVW MEDS BY RX/DR IN RCRD: CPT | Mod: CPTII,,, | Performed by: INTERNAL MEDICINE

## 2023-07-10 PROCEDURE — 99213 OFFICE O/P EST LOW 20 MIN: CPT | Mod: PBBFAC | Performed by: INTERNAL MEDICINE

## 2023-07-10 PROCEDURE — 3079F DIAST BP 80-89 MM HG: CPT | Mod: CPTII,,, | Performed by: INTERNAL MEDICINE

## 2023-07-10 PROCEDURE — 1160F PR REVIEW ALL MEDS BY PRESCRIBER/CLIN PHARMACIST DOCUMENTED: ICD-10-PCS | Mod: CPTII,,, | Performed by: INTERNAL MEDICINE

## 2023-07-10 PROCEDURE — 1159F PR MEDICATION LIST DOCUMENTED IN MEDICAL RECORD: ICD-10-PCS | Mod: CPTII,,, | Performed by: INTERNAL MEDICINE

## 2023-07-10 PROCEDURE — 99214 PR OFFICE/OUTPT VISIT, EST, LEVL IV, 30-39 MIN: ICD-10-PCS | Mod: S$PBB,,, | Performed by: INTERNAL MEDICINE

## 2023-07-10 PROCEDURE — 3044F HG A1C LEVEL LT 7.0%: CPT | Mod: CPTII,,, | Performed by: INTERNAL MEDICINE

## 2023-07-10 PROCEDURE — 3008F BODY MASS INDEX DOCD: CPT | Mod: CPTII,,, | Performed by: INTERNAL MEDICINE

## 2023-07-10 PROCEDURE — 3044F PR MOST RECENT HEMOGLOBIN A1C LEVEL <7.0%: ICD-10-PCS | Mod: CPTII,,, | Performed by: INTERNAL MEDICINE

## 2023-07-10 PROCEDURE — 99999 PR PBB SHADOW E&M-EST. PATIENT-LVL III: CPT | Mod: PBBFAC,,, | Performed by: INTERNAL MEDICINE

## 2023-07-10 PROCEDURE — 3008F PR BODY MASS INDEX (BMI) DOCUMENTED: ICD-10-PCS | Mod: CPTII,,, | Performed by: INTERNAL MEDICINE

## 2023-07-10 PROCEDURE — 3074F SYST BP LT 130 MM HG: CPT | Mod: CPTII,,, | Performed by: INTERNAL MEDICINE

## 2023-07-10 PROCEDURE — 99999 PR PBB SHADOW E&M-EST. PATIENT-LVL III: ICD-10-PCS | Mod: PBBFAC,,, | Performed by: INTERNAL MEDICINE

## 2023-07-10 PROCEDURE — 3074F PR MOST RECENT SYSTOLIC BLOOD PRESSURE < 130 MM HG: ICD-10-PCS | Mod: CPTII,,, | Performed by: INTERNAL MEDICINE

## 2023-07-10 PROCEDURE — 99214 OFFICE O/P EST MOD 30 MIN: CPT | Mod: S$PBB,,, | Performed by: INTERNAL MEDICINE

## 2023-07-10 PROCEDURE — 3079F PR MOST RECENT DIASTOLIC BLOOD PRESSURE 80-89 MM HG: ICD-10-PCS | Mod: CPTII,,, | Performed by: INTERNAL MEDICINE

## 2023-07-10 PROCEDURE — 1159F MED LIST DOCD IN RCRD: CPT | Mod: CPTII,,, | Performed by: INTERNAL MEDICINE

## 2023-07-10 RX ORDER — NITROGLYCERIN 0.4 MG/1
0.4 TABLET SUBLINGUAL EVERY 5 MIN PRN
Qty: 30 TABLET | Refills: 3 | Status: SHIPPED | OUTPATIENT
Start: 2023-07-10

## 2023-07-10 RX ORDER — DEXAMETHASONE 1 MG/1
1 TABLET ORAL NIGHTLY
COMMUNITY
Start: 2023-07-05

## 2023-07-10 NOTE — PROGRESS NOTES
[facility] Cardiology     Subjective:    Patient ID:  Adrian Benton is a 62 y.o. male who presents for follow-up of AAA, Hypertension, Hyperlipidemia, and Coronary Artery Disease    Review of patient's allergies indicates:  No Known Allergies   He has not had any angina.  His stent to circumflex was July 2022 associated with NSTEMI.  He states he used to get angina preceding his myocardial infarction but never has limitations to activity like he did before the stent.  Did have a follow-up nuclear stress test August 2022 related to distal posterior descending artery stenosis.  The stress test was negative.  He has hypertension and hyperlipidemia.  Most recent labs were in October 2022, labs requested from his primary care physician.  He is on Zetia and Crestor 40 mg.  He is diabetic.  He no longer smokes.  He is an active man and does a lot of outdoor activity.  His blood pressure today is normal.  His last CT October 2022 confirmed abdominal aneurysm infrapopliteal 5 cm.  His medications are tolerated well.  He has some bruising related to aspirin and Plavix.      Review of Systems   Constitutional: Negative for chills, decreased appetite, diaphoresis, fever, malaise/fatigue, night sweats, weight gain and weight loss.   HENT:  Negative for congestion, ear discharge, ear pain, hearing loss, hoarse voice, nosebleeds, odynophagia, sore throat, stridor and tinnitus.    Eyes:  Negative for blurred vision, discharge, double vision, pain, photophobia, redness, vision loss in left eye, vision loss in right eye, visual disturbance and visual halos.   Cardiovascular:  Negative for chest pain, claudication, cyanosis, dyspnea on exertion, irregular heartbeat, leg swelling, near-syncope, orthopnea, palpitations, paroxysmal nocturnal dyspnea and syncope.   Respiratory:  Negative for cough, hemoptysis, shortness of breath, sleep disturbances due to  "breathing, snoring, sputum production and wheezing.    Endocrine: Negative for cold intolerance, heat intolerance, polydipsia, polyphagia and polyuria.   Hematologic/Lymphatic: Negative for adenopathy and bleeding problem. Does not bruise/bleed easily.   Skin:  Negative for color change, dry skin, flushing, itching, nail changes, poor wound healing, rash, skin cancer, suspicious lesions and unusual hair distribution.   Musculoskeletal:  Negative for arthritis, back pain, falls, gout, joint pain, joint swelling, muscle cramps, muscle weakness, myalgias, neck pain and stiffness.   Gastrointestinal:  Negative for bloating, abdominal pain, anorexia, change in bowel habit, bowel incontinence, constipation, diarrhea, dysphagia, excessive appetite, flatus, heartburn, hematemesis, hematochezia, hemorrhoids, jaundice, melena, nausea and vomiting.   Genitourinary:  Negative for bladder incontinence, decreased libido, dysuria, flank pain, frequency, genital sores, hematuria, hesitancy, incomplete emptying, nocturia and urgency.   Neurological:  Negative for aphonia, brief paralysis, difficulty with concentration, disturbances in coordination, excessive daytime sleepiness, dizziness, focal weakness, headaches, light-headedness, loss of balance, numbness, paresthesias, seizures, sensory change, tremors, vertigo and weakness.   Psychiatric/Behavioral:  Negative for altered mental status, depression, hallucinations, memory loss, substance abuse, suicidal ideas and thoughts of violence. The patient does not have insomnia and is not nervous/anxious.    Allergic/Immunologic: Negative for hives and persistent infections.      Objective:       Vitals:    07/10/23 1027   BP: 120/82   Pulse: 66   Resp: 18   SpO2: (!) 94%   Weight: 94 kg (207 lb 3.7 oz)   Height: 5' 7" (1.702 m)    Physical Exam  Constitutional:       General: He is not in acute distress.     Appearance: He is well-developed. He is not diaphoretic.   HENT:      Head: " Normocephalic and atraumatic.      Nose: Nose normal.   Eyes:      General: No scleral icterus.        Right eye: No discharge.      Conjunctiva/sclera: Conjunctivae normal.      Pupils: Pupils are equal, round, and reactive to light.   Neck:      Thyroid: No thyromegaly.      Vascular: No JVD.      Trachea: No tracheal deviation.   Cardiovascular:      Rate and Rhythm: Normal rate and regular rhythm.      Pulses:           Carotid pulses are 2+ on the right side and 2+ on the left side.       Radial pulses are 2+ on the right side.        Dorsalis pedis pulses are 2+ on the right side and 2+ on the left side.        Posterior tibial pulses are 2+ on the left side.      Heart sounds: Normal heart sounds. No murmur heard.    No friction rub. No gallop.   Pulmonary:      Effort: Pulmonary effort is normal. No respiratory distress.      Breath sounds: Normal breath sounds. No stridor. No wheezing or rales.   Chest:      Chest wall: No tenderness.   Abdominal:      General: Bowel sounds are normal. There is no distension.      Palpations: Abdomen is soft. There is no mass.      Tenderness: There is no abdominal tenderness. There is no right CVA tenderness, guarding or rebound.   Musculoskeletal:         General: No tenderness. Normal range of motion.      Cervical back: Normal range of motion and neck supple.   Lymphadenopathy:      Cervical: No cervical adenopathy.   Skin:     General: Skin is warm and dry.      Coloration: Skin is not pale.      Findings: No erythema or rash.   Neurological:      Mental Status: He is alert and oriented to person, place, and time.      Cranial Nerves: No cranial nerve deficit.      Coordination: Coordination normal.   Psychiatric:         Behavior: Behavior normal.         Thought Content: Thought content normal.         Judgment: Judgment normal.         Assessment:       1. Infrarenal abdominal aortic aneurysm (AAA) without rupture    2. NSTEMI (non-ST elevated myocardial infarction)     3. Coronary artery disease involving native coronary artery of native heart without angina pectoris    4. Essential hypertension    5. Mixed hyperlipidemia    6. Controlled type 2 diabetes mellitus without complication, without long-term current use of insulin    7. Tobacco abuse      Results for orders placed or performed in visit on 04/21/23   CBC Auto Differential   Result Value Ref Range    WBC 9.46 3.90 - 12.70 K/uL    RBC 5.37 4.60 - 6.20 M/uL    Hemoglobin 16.1 14.0 - 18.0 g/dL    Hematocrit 48.7 40.0 - 54.0 %    MCV 91 82 - 98 fL    MCH 30.0 27.0 - 31.0 pg    MCHC 33.1 32.0 - 36.0 g/dL    RDW 14.8 (H) 11.5 - 14.5 %    Platelets 248 150 - 450 K/uL    MPV 9.1 (L) 9.2 - 12.9 fL    Immature Granulocytes 0.3 0.0 - 0.5 %    Gran # (ANC) 5.7 1.8 - 7.7 K/uL    Immature Grans (Abs) 0.03 0.00 - 0.04 K/uL    Lymph # 2.6 1.0 - 4.8 K/uL    Mono # 0.7 0.3 - 1.0 K/uL    Eos # 0.4 0.0 - 0.5 K/uL    Baso # 0.05 0.00 - 0.20 K/uL    nRBC 0 0 /100 WBC    Gran % 60.1 38.0 - 73.0 %    Lymph % 27.6 18.0 - 48.0 %    Mono % 7.2 4.0 - 15.0 %    Eosinophil % 4.3 0.0 - 8.0 %    Basophil % 0.5 0.0 - 1.9 %    Differential Method Automated    Comprehensive Metabolic Panel   Result Value Ref Range    Sodium 140 136 - 145 mmol/L    Potassium 4.1 3.5 - 5.1 mmol/L    Chloride 106 95 - 110 mmol/L    CO2 21 (L) 23 - 29 mmol/L    Glucose 144 (H) 70 - 110 mg/dL    BUN 14 8 - 23 mg/dL    Creatinine 0.8 0.5 - 1.4 mg/dL    Calcium 9.7 8.7 - 10.5 mg/dL    Total Protein 7.7 6.0 - 8.4 g/dL    Albumin 4.1 3.5 - 5.2 g/dL    Total Bilirubin 0.5 0.1 - 1.0 mg/dL    Alkaline Phosphatase 95 55 - 135 U/L    AST 26 10 - 40 U/L    ALT 33 10 - 44 U/L    Anion Gap 13 8 - 16 mmol/L    eGFR >60.0 >60 mL/min/1.73 m^2   Hemoglobin A1C   Result Value Ref Range    Hemoglobin A1C 6.2 (H) 4.0 - 5.6 %    Estimated Avg Glucose 131 68 - 131 mg/dL   TSH   Result Value Ref Range    TSH 2.506 0.400 - 4.000 uIU/mL         Current Outpatient Medications:     aspirin (ECOTRIN)  81 MG EC tablet, Take 81 mg by mouth once daily., Disp: , Rfl:     ezetimibe (ZETIA) 10 mg tablet, Take 1 tablet (10 mg total) by mouth once daily., Disp: 90 tablet, Rfl: 4    isosorbide mononitrate (IMDUR) 30 MG 24 hr tablet, Take 1 tablet (30 mg total) by mouth once daily., Disp: 90 tablet, Rfl: 3    metFORMIN (GLUCOPHAGE) 500 MG tablet, Take 1 tablet (500 mg total) by mouth 2 (two) times daily with meals., Disp: 180 tablet, Rfl: 3    metoprolol succinate (TOPROL-XL) 50 MG 24 hr tablet, Take 1 tablet (50 mg total) by mouth once daily., Disp: 90 tablet, Rfl: 3    rosuvastatin (CRESTOR) 40 MG Tab, Take 1 tablet (40 mg total) by mouth every evening., Disp: 90 tablet, Rfl: 3    vitamin D (VITAMIN D3) 1000 units Tab, Take 1,000 Units by mouth once daily., Disp: , Rfl:     dexAMETHasone (DECADRON) 1 MG Tab, Take 1 mg by mouth every evening., Disp: , Rfl:     nitroGLYCERIN (NITROSTAT) 0.4 MG SL tablet, Place 1 tablet (0.4 mg total) under the tongue every 5 (five) minutes as needed for Chest pain., Disp: 30 tablet, Rfl: 3     Lab Results   Component Value Date    WBC 9.46 04/21/2023    RBC 5.37 04/21/2023    HGB 16.1 04/21/2023    HCT 48.7 04/21/2023    MCV 91 04/21/2023    MCH 30.0 04/21/2023    MCHC 33.1 04/21/2023    RDW 14.8 (H) 04/21/2023     04/21/2023    MPV 9.1 (L) 04/21/2023    GRAN 5.7 04/21/2023    GRAN 60.1 04/21/2023    LYMPH 2.6 04/21/2023    LYMPH 27.6 04/21/2023    MONO 0.7 04/21/2023    MONO 7.2 04/21/2023    EOS 0.4 04/21/2023    BASO 0.05 04/21/2023    EOSINOPHIL 4.3 04/21/2023    BASOPHIL 0.5 04/21/2023    MG 1.9 07/28/2022        CMP  Lab Results   Component Value Date     04/21/2023    K 4.1 04/21/2023     04/21/2023    CO2 21 (L) 04/21/2023     (H) 04/21/2023    BUN 14 04/21/2023    CREATININE 0.8 04/21/2023    CALCIUM 9.7 04/21/2023    PROT 7.7 04/21/2023    ALBUMIN 4.1 04/21/2023    BILITOT 0.5 04/21/2023    ALKPHOS 95 04/21/2023    AST 26 04/21/2023    ALT 33 04/21/2023     ANIONGAP 13 04/21/2023    ESTGFRAFRICA >60 07/28/2022    EGFRNONAA >60 07/28/2022        No results found for: LABBLOO, LABURIN, RESPIRATORYC, GSRESP         Results for orders placed or performed during the hospital encounter of 03/22/23   EKG 12-lead    Collection Time: 03/22/23  9:12 AM    Narrative    Test Reason : R10.13,    Vent. Rate : 060 BPM     Atrial Rate : 060 BPM     P-R Int : 158 ms          QRS Dur : 090 ms      QT Int : 408 ms       P-R-T Axes : 043 032 024 degrees     QTc Int : 408 ms    Normal sinus rhythm  Within normal limits  When compared with ECG of 27-JUL-2022 16:09,  ST no longer elevated in Inferior leads  Non-specific change in ST segment in Lateral leads  Confirmed by Salvatore Chavez MD ([id number]) on 3/22/2023 11:19:46 AM    Referred By: [email or web address]   SELF           Confirmed By:Salvatore Chavez MD                  Plan:       Problem List Items Addressed This Visit          Cardiac/Vascular    Essential hypertension     He will continue metoprolol 50 mg daily.  Blood pressure in my office today.  Condition stable.         Abdominal aortic aneurysm (AAA) without rupture - Primary     I told the patient I would be willing to order the follow-up CT scan planned in December 2023.  His last study had measurement in the 5 cm range.  Endograft treatment planned in the future.         Relevant Orders    CTA Chest Abdomen Non Coronary    Creatinine, serum    Mixed hyperlipidemia     Currently, pending labs in follow-up.  He is on Zetia 10 mg Crestor 40 mg.  Labs will be requested.         NSTEMI (non-ST elevated myocardial infarction)     July 2022 event.         Relevant Medications    nitroGLYCERIN (NITROSTAT) 0.4 MG SL tablet    Coronary artery disease involving native coronary artery of native heart without angina pectoris     He is free of angina.  Clopidogrel withdrawn today.  He will stay on aspirin.  He had NSTEMI and coronary stent to circumflex July 2022.  Condition stable.  Last stress  test August 20 22- for ischemia with normal exercise tolerance.            Endocrine    Controlled type 2 diabetes mellitus without complication, without long-term current use of insulin     He is on metformin.  Condition unchanged.            Other    Tobacco abuse     He has quit smoking.             I told the patient I would follow his abdominal aortic aneurysm.  He can stop Plavix today.  I made a 5 month follow-up.  I need to review his cholesterol lab values from his PCP.    He remains free of angina.  He is on beta-blocker therapy.                   Arnold Leal MD  07/11/2023   11:19 AM

## 2023-07-11 NOTE — ASSESSMENT & PLAN NOTE
I told the patient I would be willing to order the follow-up CT scan planned in December 2023.  His last study had measurement in the 5 cm range.  Endograft treatment planned in the future.

## 2023-07-11 NOTE — ASSESSMENT & PLAN NOTE
He is free of angina.  Clopidogrel withdrawn today.  He will stay on aspirin.  He had NSTEMI and coronary stent to circumflex July 2022.  Condition stable.  Last stress test August 20 22- for ischemia with normal exercise tolerance.

## 2023-07-11 NOTE — ASSESSMENT & PLAN NOTE
Currently, pending labs in follow-up.  He is on Zetia 10 mg Crestor 40 mg.  Labs will be requested.

## 2023-07-11 NOTE — ASSESSMENT & PLAN NOTE
Achilles Tendon: Exercises  Your Care Instructions  Here are some examples of exercises for your achilles tendon. Start each exercise slowly. Ease off the exercise if you start to have pain. Your doctor or physical therapist will tell you when you can start these exercises and which ones will work best for you. How to do the exercises  Toe stretch    1. Sit in a chair, and extend your affected leg so that your heel is on the floor. 2. With your hand, reach down and pull your big toe up and back. Pull toward your ankle and away from the floor. 3. Hold the position for at least 15 to 30 seconds. 4. Repeat 2 to 4 times a session, several times a day. Calf-plantar fascia stretch    1. Sit with your legs extended and knees straight. 2. Place a towel around your foot just under the toes. 3. Hold each end of the towel in each hand, with your hands above your knees. 4. Pull back with the towel so that your foot stretches toward you. 5. Hold the position for at least 15 to 30 seconds. 6. Repeat 2 to 4 times a session, up to 5 sessions a day. Floor stretch    1. Stand about 2 feet from a wall, and place your hands on the wall at about shoulder height. Or you can stand behind a chair, placing your hands on the back of it for balance. 2. Step back with the leg you want to stretch. Keep the leg straight, and press your heel into the floor with your toe turned slightly in.  3. Lean forward, and bend your other leg slightly. Feel the stretch in the Achilles tendon of your back leg. Hold for at least 15 to 30 seconds. 4. Repeat 2 to 4 times a session, up to 5 sessions a day. Stair stretch    1. Stand with the balls of both feet on the edge of a step or curb (or a medium-sized phone book). With at least one hand, hold onto something solid for balance, such as a banister or handrail.   2. Keeping your affected leg straight, slowly let that heel hang down off of the step or curb until you feel a stretch in the back He is on metformin.  Condition unchanged.   of your calf and/or Achilles area. Some of your weight should still be on the other leg. 3. Hold this position for at least 15 to 30 seconds. 4. Repeat 2 to 4 times a session, up to 5 times a day or whenever your Achilles tendon starts to feel tight. This stretch can also be done with your knee slightly bent. Strength exercise    1. This exercise will get you started on building strength after an Achilles tendon injury. Your doctor or physical therapist can help you move on to more challenging exercises as you heal and get stronger. 2. Stand on a step with your heel off the edge of the step. Hold on to a handrail or wall for balance. 3. Push up on your toes, then slowly count to 10 as you lower yourself back down until your heel is below the step. If it hurts to push up on your toes, try putting most of your weight on your other foot as you push up, or try using your arms to help you. If you can't do this exercise without causing pain, stop the exercise and talk to your doctor. 4. Repeat the exercise 8 to 12 times, half with the knee straight and half with the knee bent. Follow-up care is a key part of your treatment and safety. Be sure to make and go to all appointments, and call your doctor if you are having problems. It's also a good idea to know your test results and keep a list of the medicines you take. Where can you learn more? Go to http://areli-lucy.info/. Enter Y605 in the search box to learn more about \"Achilles Tendon: Exercises. \"  Current as of: March 21, 2017  Content Version: 11.4  © 4374-9863 [employer]. Care instructions adapted under license by [employer] (which disclaims liability or warranty for this information). If you have questions about a medical condition or this instruction, always ask your healthcare professional. [address] any warranty or liability for your use of this information.

## 2023-10-31 DIAGNOSIS — E78.2 MIXED HYPERLIPIDEMIA: ICD-10-CM

## 2023-11-01 RX ORDER — EZETIMIBE 10 MG/1
10 TABLET ORAL
Qty: 90 TABLET | Refills: 4 | Status: SHIPPED | OUTPATIENT
Start: 2023-11-01

## 2023-11-07 ENCOUNTER — PATIENT MESSAGE (OUTPATIENT)
Dept: ENDOCRINOLOGY | Facility: CLINIC | Age: 63
End: 2023-11-07
Payer: MEDICAID

## 2024-05-08 DIAGNOSIS — E11.9 TYPE 2 DIABETES MELLITUS WITHOUT COMPLICATION: ICD-10-CM

## 2024-08-10 ENCOUNTER — ANESTHESIA EVENT (OUTPATIENT)
Dept: SURGERY | Facility: HOSPITAL | Age: 64
DRG: 269 | End: 2024-08-10
Payer: MEDICAID

## 2024-08-12 ENCOUNTER — TELEPHONE (OUTPATIENT)
Dept: VASCULAR SURGERY | Facility: CLINIC | Age: 64
End: 2024-08-12
Payer: MEDICAID

## 2024-08-12 NOTE — ANESTHESIA PREPROCEDURE EVALUATION
[facility]  Anesthesia Pre-Operative Evaluation         Patient Name: Adrian Benton  YOB: 1960  MRN: [id number]    SUBJECTIVE:     Pre-operative evaluation for Procedure(s) (LRB):  REPAIR, ANEURYSM, AORTA, ENDOVASCULAR (N/A)     08/12/2024    Adrian Benton is a 63 y.o. male w/ a significant PMHx of HTN, NIDDM2, CAD s/p NSTEMI + PCI (July 2022), BMI 33, smoker, HLD, and 5.3cm infrarenal AAA.     Patient now presents for the above procedure(s).      Nuclear Stress Test 8/2022    The EKG portion of this study is negative for ischemia.    The patient reported no chest pain during the stress test.    There were no arrhythmias during stress.    The nuclear portion of this study will be reported separately.    Echo 7/2022  1. Left ventricle: Normal in size with normal systolic function. LV ejection fraction is 55-60%. No regional wall motion abnormalities are evident. Normal LV geometry.  2. Left ventricular diastolic function: Normal.  3. Right ventricle: Normal in size with normal systolic function.  4. Estimated PASP: <40 mmHg normal Estimated RAP: 3 mmHg based on IVC assessment.  5. Left atrium: Normal in size.  Right atrium: Normal in size  6. Valves: No clinically significant abnormalities detected.  7. Aorta: Normal caliber when indexed for BSA  8. Pericardium: Normal thickness No effusion present.  9. Technical quality is adequate. challenging due to acoustic windows. Echo contrast was utilized to improve endocardial visualization.  10. No prior studies available for comparison         Prev airway: None documented.        Patient Active Problem List   Diagnosis    Essential hypertension    Tobacco abuse    History of colon polyps    Controlled type 2 diabetes mellitus without complication, without long-term current use of insulin    Coronary artery calcification    Abdominal aortic aneurysm (AAA) without rupture    Mixed hyperlipidemia    NSTEMI (non-ST  elevated myocardial infarction)    Coronary artery disease involving native coronary artery of native heart without angina pectoris    Adenoma of left adrenal gland       Review of patient's allergies indicates:  No Known Allergies    Current Inpatient Medications:      No current facility-administered medications on file prior to encounter.     Current Outpatient Medications on File Prior to Encounter   Medication Sig Dispense Refill    aspirin (ECOTRIN) 81 MG EC tablet Take 81 mg by mouth once daily.      clotrimazole (LOTRIMIN) 1 % cream Apply to affected area 2 times daily (Patient not taking: Reported on 7/8/2024) 15 g 0    ezetimibe (ZETIA) 10 mg tablet TAKE 1 TABLET(10 MG) BY MOUTH EVERY DAY 90 tablet 4    isosorbide mononitrate (IMDUR) 30 MG 24 hr tablet Take 1 tablet (30 mg total) by mouth once daily. (Patient not taking: Reported on 7/8/2024) 90 tablet 3    metFORMIN (GLUCOPHAGE) 500 MG tablet Take 1 tablet (500 mg total) by mouth 2 (two) times daily with meals. 180 tablet 3    nicotine (NICODERM CQ) 14 mg/24 hr Place 1 patch onto the skin once daily. 30 patch 2    nitroGLYCERIN (NITROSTAT) 0.4 MG SL tablet Place 1 tablet (0.4 mg total) under the tongue every 5 (five) minutes as needed for Chest pain. 30 tablet 3    omeprazole (PRILOSEC) 20 MG capsule TAKE 1 CAPSULE(20 MG) BY MOUTH EVERY DAY 90 capsule 3    rosuvastatin (CRESTOR) 40 MG Tab Take 1 tablet (40 mg total) by mouth every evening. 90 tablet 3    vitamin D (VITAMIN D3) 1000 units Tab Take 1,000 Units by mouth once daily.         Past Surgical History:   Procedure Laterality Date    ANGIOGRAM, CORONARY, WITH LEFT HEART CATHETERIZATION N/A 7/27/2022    Procedure: Angiogram, Coronary, with Left Heart Cath;  Surgeon: Mckenzie Barba MD;  Location: [facility] CATH LAB/EP;  Service: Cardiology;  Laterality: N/A;    COLONOSCOPY N/A 7/9/2019    Procedure: COLONOSCOPY;  Surgeon: Luis Bogran-Reyes, MD;  Location: [facility];  Service: Endoscopy;   Laterality: N/A;    CORONARY ANGIOGRAPHY Right 7/25/2022    Procedure: ANGIOGRAM, CORONARY ARTERY;  Surgeon: Tawnya Pruett MD;  Location: [facility] CATH LAB;  Service: Cardiology;  Laterality: Right;    CORONARY ANGIOGRAPHY Left 7/27/2022    Procedure: Left heart cath;  Surgeon: Mckenzie Barba MD;  Location: [facility] CATH LAB/EP;  Service: Cardiology;  Laterality: Left;    LEFT HEART CATHETERIZATION Left 7/25/2022    Procedure: Left heart cath;  Surgeon: Tawnya Pruett MD;  Location: [facility] CATH LAB;  Service: Cardiology;  Laterality: Left;       Social History     Socioeconomic History    Marital status:      Spouse name: Lis    Number of children: 2   Occupational History    Occupation: unemployed   Tobacco Use    Smoking status: Some Days     Current packs/day: 1.50     Average packs/day: 1.5 packs/day for 48.6 years (72.9 ttl pk-yrs)     Types: Cigarettes     Start date: 1976    Smokeless tobacco: Never    Tobacco comments:     Pt states switched from cigarettes to Black & Mild cigars circa 2017. He declines referral to [facility]oking Cessation clinic, stating that he will quit on own.   Substance and Sexual Activity    Alcohol use: Yes     Comment: occ    Drug use: No    Sexual activity: Yes     Partners: Female   Social History Narrative    Highest level of education: 9th.     Pt out of work for past 4 months ago; Structure  at [employer].      Social Determinants of Health     Stress: No Stress Concern Present (1/28/2020)    [employer] - Occupational Stress Questionnaire     Feeling of Stress : Not at all       OBJECTIVE:     Vital Signs Range (Last 24H):         Significant Labs:  Lab Results   Component Value Date    WBC 8.82 06/10/2024    HGB 16.5 06/10/2024    HCT 50.2 06/10/2024     06/10/2024    CHOL 164 06/10/2024    TRIG 163 (H) 06/10/2024    HDL 35 (L) 06/10/2024    ALT 25 06/10/2024    AST 21 06/10/2024      06/10/2024    K 5.0 06/10/2024     06/10/2024    CREATININE 0.8 07/19/2024    BUN 13 06/10/2024    CO2 26 06/10/2024    TSH 2.674 06/10/2024    PSA 2.5 10/27/2023    INR 1.1 07/27/2022    HGBA1C 5.3 06/03/2024       Diagnostic Studies: No relevant studies.    EKG:   Results for orders placed or performed during the hospital encounter of 03/22/23   EKG 12-lead    Collection Time: 03/22/23  9:12 AM    Narrative    Test Reason : R10.13,    Vent. Rate : 060 BPM     Atrial Rate : 060 BPM     P-R Int : 158 ms          QRS Dur : 090 ms      QT Int : 408 ms       P-R-T Axes : 043 032 024 degrees     QTc Int : 408 ms    Normal sinus rhythm  Within normal limits  When compared with ECG of 27-JUL-2022 16:09,  ST no longer elevated in Inferior leads  Non-specific change in ST segment in Lateral leads  Confirmed by Salvatore Chavez MD ([id number]) on 3/22/2023 11:19:46 AM    Referred By: [email or web address]   SELF           Confirmed By:Salvatore Chavez MD       2D ECHO:  TTE:  Results for orders placed or performed during the hospital encounter of 07/23/22   Echo   Result Value Ref Range    BSA 2.16 m2    TDI SEPTAL 0.06 m/s    LV LATERAL E/E' RATIO 7.67 m/s    LV SEPTAL E/E' RATIO 15.33 m/s    LA WIDTH 4.00 cm    Right Atrial Pressure (from IVC) 3 mmHg    IVC diameter 1.35 cm    RV mid diameter 3.00 cm    QEF 63 %    EF 60 %    Left Ventricular Outflow Tract Mean Velocity 0.7838837948 cm/s    Left Ventricular Outflow Tract Mean Gradient 2.34 mmHg    TDI LATERAL 0.12 m/s    LVIDd 4.71 3.5 - 6.0 cm    IVS 0.79 0.6 - 1.1 cm    Posterior Wall 0.64 0.6 - 1.1 cm    LVIDs 4.24 (A) 2.1 - 4.0 cm    FS 10 28 - 44 %    LA volume 70.21 cm3    STJ 2.97 cm    Ascending aorta 3.06 cm    LV mass 106.25 g    LA size 4.07 cm    RVDD 3.43 cm    TAPSE 1.80 cm    Right ventricular length in diastole (apical 4-chamber view) 6.50 cm    RV S' 11 cm/s    Left Ventricle Relative Wall Thickness 0.27 cm    AV mean gradient 5 mmHg    AV valve area 2.72 cm2    AV  Velocity Ratio 0.75     AV index (prosthetic) 0.67     E/A ratio 1.44     Mean e' 0.09 m/s    E wave deceleration time [id number] msec    Pulm vein S/D ratio 2.15     LVOT diameter 2.27 cm    LVOT area 4.0 cm2    LVOT peak collin 1.10 m/s    LVOT peak VTI 23.20 cm    Ao peak collin 1.46 m/s    Ao VTI 34.5 cm    LVOT stroke volume 93.84 cm3    AV peak gradient 9 mmHg    TV resting pulmonary artery pressure 25 mmHg    E/E' ratio 10.22 m/s    MV Peak E Collin 0.92 m/s    TR Max Collin 2.35 m/s    MV Peak A Collin 0.64 m/s    PV Peak S Collin 0.6267026180 m/s    PV Peak D Collin 0.26 m/s    LV Systolic Volume 80.47 mL    LV Systolic Volume Index 37.8 mL/m2    LV Diastolic Volume 102.85 mL    LV Diastolic Volume Index 48.29 mL/m2    LA Volume Index 33.0 mL/m2    LV Mass Index 50 g/m2    RA Major Axis 4.92 cm    Left Atrium Minor Axis 5.00 cm    Left Atrium Major Axis 5.15 cm    Triscuspid Valve Regurgitation Peak Gradient 22 mmHg    LA Volume Index (Mod) 30.5 mL/m2    LA volume (mod) 65.00 cm3    RA Width 3.80 cm    Narrative    1. Left ventricle: Normal in size with normal systolic function. LV   ejection fraction is 55-60%. No regional wall motion abnormalities are   evident. Normal LV geometry.  2. Left ventricular diastolic function: Normal.  3. Right ventricle: Normal in size with normal systolic function.   4. Estimated PASP: <40 mmHg normal Estimated RAP: 3 mmHg based on IVC   assessment.  5. Left atrium: Normal in size.  Right atrium: Normal in size  6. Valves: No clinically significant abnormalities detected.  7. Aorta: Normal caliber when indexed for BSA  8. Pericardium: Normal thickness No effusion present.  9. Technical quality is adequate. challenging due to acoustic windows.   Echo contrast was utilized to improve endocardial visualization.  10. No prior studies available for comparison         ASHLEY:  No results found for this or any previous visit.    ASSESSMENT/PLAN:           Pre-op Assessment    I have reviewed the  Patient Summary Reports.     I have reviewed the Nursing Notes. I have reviewed the NPO Status.   I have reviewed the Medications.     Review of Systems  Anesthesia Hx:  No problems with previous Anesthesia   History of prior surgery of interest to airway management or planning:          Denies Family Hx of Anesthesia complications.    Denies Personal Hx of Anesthesia complications.                    Social:  No Alcohol Use, Smoker       Hematology/Oncology:       -- Denies Anemia:                  Denies Current/Recent Cancer                Cardiovascular:     Hypertension  Past MI CAD     Denies Dysrhythmias.    Denies CHF.    hyperlipidemia                             Pulmonary:    Denies COPD.  Denies Asthma.     Denies Sleep Apnea.                Renal/:   Denies Chronic Renal Disease.                Hepatic/GI:      Denies GERD. Denies Liver Disease.            Musculoskeletal:  Denies Arthritis.               Neurological:    Denies CVA. Denies Neuromuscular Disease.   Denies Seizures.          Denies Chronic Pain Syndrome                         Endocrine:  Diabetes, type 2   Denies Hyperthyroidism.       Obesity / BMI > 30  Psych:   denies anxiety denies depression              Physical Exam  General: Alert and Oriented    Airway:  Mallampati: II / II  Mouth Opening: Normal  TM Distance: Normal  Tongue: Normal  Neck ROM: Normal ROM    Dental:  Intact    Chest/Lungs:  Clear to auscultation, Normal Respiratory Rate    Heart:  Rate: Normal  Rhythm: Regular Rhythm  Sounds: Normal    Anesthesia Plan  Type of Anesthesia, risks & benefits discussed:    Anesthesia Type: Gen ETT  Intra-op Monitoring Plan: Standard ASA Monitors and Art Line  Post Op Pain Control Plan: multimodal analgesia and IV/PO Opioids PRN  Induction:  IV  Airway Plan: Direct and Video, Post-Induction  Informed Consent: Informed consent signed with the Patient and all parties understand the risks and agree with anesthesia plan.  All questions  answered.   ASA Score: 3  Day of Surgery Review of History & Physical: H&P Update referred to the surgeon/provider.    Ready For Surgery From Anesthesia Perspective.     .

## 2024-08-13 ENCOUNTER — HOSPITAL ENCOUNTER (INPATIENT)
Facility: HOSPITAL | Age: 64
LOS: 1 days | Discharge: HOME | DRG: 269 | End: 2024-08-14
Attending: SURGERY | Admitting: SURGERY
Payer: MEDICAID

## 2024-08-13 ENCOUNTER — ANESTHESIA (OUTPATIENT)
Dept: SURGERY | Facility: HOSPITAL | Age: 64
DRG: 269 | End: 2024-08-13
Payer: MEDICAID

## 2024-08-13 DIAGNOSIS — I71.40 ABDOMINAL AORTIC ANEURYSM (AAA) WITHOUT RUPTURE, UNSPECIFIED PART: ICD-10-CM

## 2024-08-13 DIAGNOSIS — I71.40 ABDOMINAL AORTIC ANEURYSM (AAA) WITHOUT RUPTURE: Primary | ICD-10-CM

## 2024-08-13 LAB
ABO + RH BLD: NORMAL
BLD GP AB SCN CELLS X3 SERPL QL: NORMAL
POC ACTIVATED CLOTTING TIME K: 134 SEC (ref 74–137)
POC ACTIVATED CLOTTING TIME K: 238 SEC (ref 74–137)
POC ACTIVATED CLOTTING TIME K: 256 SEC (ref 74–137)
POCT GLUCOSE: 138 MG/DL (ref 70–110)
POCT GLUCOSE: 99 MG/DL (ref 70–110)
SAMPLE: ABNORMAL
SAMPLE: ABNORMAL
SAMPLE: NORMAL
SPECIMEN OUTDATE: NORMAL

## 2024-08-13 PROCEDURE — 86901 BLOOD TYPING SEROLOGIC RH(D): CPT | Performed by: STUDENT IN AN ORGANIZED HEALTH CARE EDUCATION/TRAINING PROGRAM

## 2024-08-13 PROCEDURE — 36620 INSERTION CATHETER ARTERY: CPT | Mod: 59,,, | Performed by: ANESTHESIOLOGY

## 2024-08-13 PROCEDURE — 25500020 PHARM REV CODE 255: Performed by: SURGERY

## 2024-08-13 PROCEDURE — 04V03EZ RESTRICTION OF ABDOMINAL AORTA WITH BRANCHED OR FENESTRATED INTRALUMINAL DEVICE, ONE OR TWO ARTERIES, PERCUTANEOUS APPROACH: ICD-10-PCS | Performed by: SURGERY

## 2024-08-13 PROCEDURE — 36000706: Performed by: SURGERY

## 2024-08-13 PROCEDURE — 20600001 HC STEP DOWN PRIVATE ROOM

## 2024-08-13 PROCEDURE — 71000033 HC RECOVERY, INTIAL HOUR: Performed by: SURGERY

## 2024-08-13 PROCEDURE — 63600175 PHARM REV CODE 636 W HCPCS

## 2024-08-13 PROCEDURE — 86900 BLOOD TYPING SEROLOGIC ABO: CPT | Performed by: STUDENT IN AN ORGANIZED HEALTH CARE EDUCATION/TRAINING PROGRAM

## 2024-08-13 PROCEDURE — 86920 COMPATIBILITY TEST SPIN: CPT

## 2024-08-13 PROCEDURE — C2628 CATHETER, OCCLUSION: HCPCS | Performed by: SURGERY

## 2024-08-13 PROCEDURE — 36000707: Performed by: SURGERY

## 2024-08-13 PROCEDURE — 27201423 OPTIME MED/SURG SUP & DEVICES STERILE SUPPLY: Performed by: SURGERY

## 2024-08-13 PROCEDURE — 25000003 PHARM REV CODE 250: Performed by: SURGERY

## 2024-08-13 PROCEDURE — 25000003 PHARM REV CODE 250

## 2024-08-13 PROCEDURE — C1894 INTRO/SHEATH, NON-LASER: HCPCS | Performed by: SURGERY

## 2024-08-13 PROCEDURE — C1874 STENT, COATED/COV W/DEL SYS: HCPCS | Performed by: SURGERY

## 2024-08-13 PROCEDURE — C1769 GUIDE WIRE: HCPCS | Performed by: SURGERY

## 2024-08-13 PROCEDURE — 82962 GLUCOSE BLOOD TEST: CPT | Performed by: SURGERY

## 2024-08-13 PROCEDURE — 37000008 HC ANESTHESIA 1ST 15 MINUTES: Performed by: SURGERY

## 2024-08-13 PROCEDURE — 63600175 PHARM REV CODE 636 W HCPCS: Performed by: STUDENT IN AN ORGANIZED HEALTH CARE EDUCATION/TRAINING PROGRAM

## 2024-08-13 PROCEDURE — 36415 COLL VENOUS BLD VENIPUNCTURE: CPT | Performed by: SURGERY

## 2024-08-13 PROCEDURE — 63600175 PHARM REV CODE 636 W HCPCS: Performed by: SURGERY

## 2024-08-13 PROCEDURE — 27201037 HC PRESSURE MONITORING SET UP

## 2024-08-13 PROCEDURE — 37000009 HC ANESTHESIA EA ADD 15 MINS: Performed by: SURGERY

## 2024-08-13 PROCEDURE — 71000016 HC POSTOP RECOV ADDL HR: Performed by: SURGERY

## 2024-08-13 PROCEDURE — 71000015 HC POSTOP RECOV 1ST HR: Performed by: SURGERY

## 2024-08-13 PROCEDURE — 25000003 PHARM REV CODE 250: Performed by: STUDENT IN AN ORGANIZED HEALTH CARE EDUCATION/TRAINING PROGRAM

## 2024-08-13 PROCEDURE — C1760 CLOSURE DEV, VASC: HCPCS | Performed by: SURGERY

## 2024-08-13 DEVICE — STENT GRAFT ESBF2814C103E ENDUR IIS BIF
Type: IMPLANTABLE DEVICE | Site: AORTA | Status: FUNCTIONAL
Brand: ENDURANT® IIS

## 2024-08-13 RX ORDER — HYDROCODONE BITARTRATE AND ACETAMINOPHEN 5; 325 MG/1; MG/1
1 TABLET ORAL EVERY 4 HOURS PRN
Status: DISCONTINUED | OUTPATIENT
Start: 2024-08-13 | End: 2024-08-14 | Stop reason: HOSPADM

## 2024-08-13 RX ORDER — IBUPROFEN 200 MG
1 TABLET ORAL DAILY
Status: DISCONTINUED | OUTPATIENT
Start: 2024-08-13 | End: 2024-08-14 | Stop reason: HOSPADM

## 2024-08-13 RX ORDER — ACETAMINOPHEN 325 MG/1
650 TABLET ORAL EVERY 6 HOURS
Status: DISCONTINUED | OUTPATIENT
Start: 2024-08-13 | End: 2024-08-14 | Stop reason: HOSPADM

## 2024-08-13 RX ORDER — SODIUM CHLORIDE 9 MG/ML
INJECTION, SOLUTION INTRAVENOUS CONTINUOUS
Status: DISCONTINUED | OUTPATIENT
Start: 2024-08-13 | End: 2024-08-14

## 2024-08-13 RX ORDER — ASPIRIN 81 MG/1
81 TABLET ORAL DAILY
Status: DISCONTINUED | OUTPATIENT
Start: 2024-08-13 | End: 2024-08-14 | Stop reason: HOSPADM

## 2024-08-13 RX ORDER — FENTANYL CITRATE 50 UG/ML
INJECTION, SOLUTION INTRAMUSCULAR; INTRAVENOUS
Status: DISCONTINUED | OUTPATIENT
Start: 2024-08-13 | End: 2024-08-13

## 2024-08-13 RX ORDER — ONDANSETRON HYDROCHLORIDE 2 MG/ML
INJECTION, SOLUTION INTRAVENOUS
Status: DISCONTINUED | OUTPATIENT
Start: 2024-08-13 | End: 2024-08-13

## 2024-08-13 RX ORDER — FENTANYL CITRATE 50 UG/ML
25 INJECTION, SOLUTION INTRAMUSCULAR; INTRAVENOUS EVERY 5 MIN PRN
Status: DISCONTINUED | OUTPATIENT
Start: 2024-08-13 | End: 2024-08-13 | Stop reason: HOSPADM

## 2024-08-13 RX ORDER — IBUPROFEN 200 MG
16 TABLET ORAL
Status: DISCONTINUED | OUTPATIENT
Start: 2024-08-13 | End: 2024-08-14 | Stop reason: HOSPADM

## 2024-08-13 RX ORDER — ACETAMINOPHEN 500 MG
1000 TABLET ORAL
Status: COMPLETED | OUTPATIENT
Start: 2024-08-13 | End: 2024-08-13

## 2024-08-13 RX ORDER — ATORVASTATIN CALCIUM 40 MG/1
40 TABLET, FILM COATED ORAL DAILY
Status: DISCONTINUED | OUTPATIENT
Start: 2024-08-13 | End: 2024-08-14 | Stop reason: HOSPADM

## 2024-08-13 RX ORDER — HYDROCODONE BITARTRATE AND ACETAMINOPHEN 10; 325 MG/1; MG/1
1 TABLET ORAL EVERY 4 HOURS PRN
Status: DISCONTINUED | OUTPATIENT
Start: 2024-08-13 | End: 2024-08-14 | Stop reason: HOSPADM

## 2024-08-13 RX ORDER — DEXAMETHASONE SODIUM PHOSPHATE 4 MG/ML
INJECTION, SOLUTION INTRA-ARTICULAR; INTRALESIONAL; INTRAMUSCULAR; INTRAVENOUS; SOFT TISSUE
Status: DISCONTINUED | OUTPATIENT
Start: 2024-08-13 | End: 2024-08-13

## 2024-08-13 RX ORDER — ISOSORBIDE MONONITRATE 30 MG/1
30 TABLET, EXTENDED RELEASE ORAL DAILY
Status: DISCONTINUED | OUTPATIENT
Start: 2024-08-13 | End: 2024-08-14 | Stop reason: HOSPADM

## 2024-08-13 RX ORDER — HALOPERIDOL 5 MG/ML
INJECTION INTRAMUSCULAR
Status: DISCONTINUED | OUTPATIENT
Start: 2024-08-13 | End: 2024-08-13

## 2024-08-13 RX ORDER — INSULIN ASPART 100 [IU]/ML
0-5 INJECTION, SOLUTION INTRAVENOUS; SUBCUTANEOUS
Status: DISCONTINUED | OUTPATIENT
Start: 2024-08-13 | End: 2024-08-14 | Stop reason: HOSPADM

## 2024-08-13 RX ORDER — IODIXANOL 320 MG/ML
INJECTION, SOLUTION INTRAVASCULAR
Status: DISCONTINUED | OUTPATIENT
Start: 2024-08-13 | End: 2024-08-13 | Stop reason: HOSPADM

## 2024-08-13 RX ORDER — SODIUM CHLORIDE 0.9 % (FLUSH) 0.9 %
10 SYRINGE (ML) INJECTION
Status: DISCONTINUED | OUTPATIENT
Start: 2024-08-13 | End: 2024-08-13 | Stop reason: HOSPADM

## 2024-08-13 RX ORDER — HEPARIN SODIUM 1000 [USP'U]/ML
INJECTION, SOLUTION INTRAVENOUS; SUBCUTANEOUS
Status: DISCONTINUED | OUTPATIENT
Start: 2024-08-13 | End: 2024-08-13

## 2024-08-13 RX ORDER — HEPARIN SODIUM 5000 [USP'U]/ML
5000 INJECTION, SOLUTION INTRAVENOUS; SUBCUTANEOUS EVERY 8 HOURS
Status: DISCONTINUED | OUTPATIENT
Start: 2024-08-13 | End: 2024-08-14 | Stop reason: HOSPADM

## 2024-08-13 RX ORDER — MUPIROCIN 20 MG/G
OINTMENT TOPICAL 2 TIMES DAILY
Status: DISCONTINUED | OUTPATIENT
Start: 2024-08-13 | End: 2024-08-14 | Stop reason: HOSPADM

## 2024-08-13 RX ORDER — METOPROLOL SUCCINATE 25 MG/1
25 TABLET, EXTENDED RELEASE ORAL DAILY
Status: DISCONTINUED | OUTPATIENT
Start: 2024-08-13 | End: 2024-08-14 | Stop reason: HOSPADM

## 2024-08-13 RX ORDER — HEPARIN SODIUM 1000 [USP'U]/ML
INJECTION, SOLUTION INTRAVENOUS; SUBCUTANEOUS
Status: DISCONTINUED | OUTPATIENT
Start: 2024-08-13 | End: 2024-08-13 | Stop reason: HOSPADM

## 2024-08-13 RX ORDER — GLUCAGON 1 MG
1 KIT INJECTION
Status: DISCONTINUED | OUTPATIENT
Start: 2024-08-13 | End: 2024-08-14 | Stop reason: HOSPADM

## 2024-08-13 RX ORDER — IBUPROFEN 200 MG
24 TABLET ORAL
Status: DISCONTINUED | OUTPATIENT
Start: 2024-08-13 | End: 2024-08-14 | Stop reason: HOSPADM

## 2024-08-13 RX ORDER — PROTAMINE SULFATE 10 MG/ML
INJECTION, SOLUTION INTRAVENOUS
Status: DISCONTINUED | OUTPATIENT
Start: 2024-08-13 | End: 2024-08-13

## 2024-08-13 RX ORDER — ROCURONIUM BROMIDE 10 MG/ML
INJECTION, SOLUTION INTRAVENOUS
Status: DISCONTINUED | OUTPATIENT
Start: 2024-08-13 | End: 2024-08-13

## 2024-08-13 RX ORDER — NITROGLYCERIN 0.4 MG/1
0.4 TABLET SUBLINGUAL EVERY 5 MIN PRN
Status: DISCONTINUED | OUTPATIENT
Start: 2024-08-13 | End: 2024-08-14 | Stop reason: HOSPADM

## 2024-08-13 RX ORDER — KETAMINE HCL IN 0.9 % NACL 50 MG/5 ML
SYRINGE (ML) INTRAVENOUS
Status: DISCONTINUED | OUTPATIENT
Start: 2024-08-13 | End: 2024-08-13

## 2024-08-13 RX ORDER — MIDAZOLAM HYDROCHLORIDE 1 MG/ML
INJECTION INTRAMUSCULAR; INTRAVENOUS
Status: DISCONTINUED | OUTPATIENT
Start: 2024-08-13 | End: 2024-08-13

## 2024-08-13 RX ORDER — METOCLOPRAMIDE HYDROCHLORIDE 5 MG/ML
10 INJECTION INTRAMUSCULAR; INTRAVENOUS EVERY 10 MIN PRN
Status: DISCONTINUED | OUTPATIENT
Start: 2024-08-13 | End: 2024-08-13 | Stop reason: HOSPADM

## 2024-08-13 RX ORDER — PANTOPRAZOLE SODIUM 40 MG/1
40 TABLET, DELAYED RELEASE ORAL DAILY
Status: DISCONTINUED | OUTPATIENT
Start: 2024-08-13 | End: 2024-08-14 | Stop reason: HOSPADM

## 2024-08-13 RX ORDER — CHOLECALCIFEROL (VITAMIN D3) 25 MCG
1000 TABLET ORAL DAILY
Status: DISCONTINUED | OUTPATIENT
Start: 2024-08-13 | End: 2024-08-14 | Stop reason: HOSPADM

## 2024-08-13 RX ORDER — LIDOCAINE HYDROCHLORIDE 20 MG/ML
INJECTION, SOLUTION EPIDURAL; INFILTRATION; INTRACAUDAL; PERINEURAL
Status: DISCONTINUED | OUTPATIENT
Start: 2024-08-13 | End: 2024-08-13

## 2024-08-13 RX ORDER — HYDROCODONE BITARTRATE AND ACETAMINOPHEN 500; 5 MG/1; MG/1
TABLET ORAL
Status: DISCONTINUED | OUTPATIENT
Start: 2024-08-13 | End: 2024-08-14 | Stop reason: HOSPADM

## 2024-08-13 RX ORDER — GLUCAGON 1 MG
1 KIT INJECTION
Status: DISCONTINUED | OUTPATIENT
Start: 2024-08-13 | End: 2024-08-13 | Stop reason: HOSPADM

## 2024-08-13 RX ORDER — CEFAZOLIN SODIUM 1 G/3ML
INJECTION, POWDER, FOR SOLUTION INTRAMUSCULAR; INTRAVENOUS
Status: DISCONTINUED | OUTPATIENT
Start: 2024-08-13 | End: 2024-08-13

## 2024-08-13 RX ORDER — PROPOFOL 10 MG/ML
VIAL (ML) INTRAVENOUS
Status: DISCONTINUED | OUTPATIENT
Start: 2024-08-13 | End: 2024-08-13

## 2024-08-13 RX ADMIN — SODIUM CHLORIDE, SODIUM GLUCONATE, SODIUM ACETATE, POTASSIUM CHLORIDE, MAGNESIUM CHLORIDE, SODIUM PHOSPHATE, DIBASIC, AND POTASSIUM PHOSPHATE: .53; .5; .37; .037; .03; .012; .00082 INJECTION, SOLUTION INTRAVENOUS at 08:08

## 2024-08-13 RX ADMIN — HALOPERIDOL LACTATE 1 MG: 5 INJECTION, SOLUTION INTRAMUSCULAR at 09:08

## 2024-08-13 RX ADMIN — ATORVASTATIN CALCIUM 40 MG: 40 TABLET, FILM COATED ORAL at 10:08

## 2024-08-13 RX ADMIN — FENTANYL CITRATE 25 MCG: 50 INJECTION, SOLUTION INTRAMUSCULAR; INTRAVENOUS at 08:08

## 2024-08-13 RX ADMIN — ROCURONIUM BROMIDE 40 MG: 10 INJECTION INTRAVENOUS at 08:08

## 2024-08-13 RX ADMIN — PROTAMINE SULFATE 5 MG: 10 INJECTION, SOLUTION INTRAVENOUS at 09:08

## 2024-08-13 RX ADMIN — Medication 20 MG: at 07:08

## 2024-08-13 RX ADMIN — GLYCOPYRROLATE 0.2 MG: 0.2 INJECTION INTRAMUSCULAR; INTRAVENOUS at 07:08

## 2024-08-13 RX ADMIN — MIDAZOLAM 2 MG: 1 INJECTION INTRAMUSCULAR; INTRAVENOUS at 06:08

## 2024-08-13 RX ADMIN — ISOSORBIDE MONONITRATE 30 MG: 30 TABLET, EXTENDED RELEASE ORAL at 11:08

## 2024-08-13 RX ADMIN — MUPIROCIN: 20 OINTMENT TOPICAL at 10:08

## 2024-08-13 RX ADMIN — CEFAZOLIN 2 G: 2 INJECTION, POWDER, FOR SOLUTION INTRAMUSCULAR; INTRAVENOUS at 03:08

## 2024-08-13 RX ADMIN — ACETAMINOPHEN 1000 MG: 500 TABLET ORAL at 06:08

## 2024-08-13 RX ADMIN — ACETAMINOPHEN 650 MG: 325 TABLET ORAL at 06:08

## 2024-08-13 RX ADMIN — PROTAMINE SULFATE 10 MG: 10 INJECTION, SOLUTION INTRAVENOUS at 09:08

## 2024-08-13 RX ADMIN — HEPARIN SODIUM 5000 UNITS: 1000 INJECTION, SOLUTION INTRAVENOUS; SUBCUTANEOUS at 07:08

## 2024-08-13 RX ADMIN — HEPARIN SODIUM 5000 UNITS: 5000 INJECTION INTRAVENOUS; SUBCUTANEOUS at 10:08

## 2024-08-13 RX ADMIN — CHOLECALCIFEROL TAB 25 MCG (1000 UNIT) 1000 UNITS: 25 TAB at 10:08

## 2024-08-13 RX ADMIN — PHENYLEPHRINE HYDROCHLORIDE 0.3 MCG/KG/MIN: 10 INJECTION INTRAVENOUS at 07:08

## 2024-08-13 RX ADMIN — SODIUM CHLORIDE: 9 INJECTION, SOLUTION INTRAVENOUS at 10:08

## 2024-08-13 RX ADMIN — HEPARIN SODIUM 5000 UNITS: 5000 INJECTION INTRAVENOUS; SUBCUTANEOUS at 04:08

## 2024-08-13 RX ADMIN — FENTANYL CITRATE 50 MCG: 50 INJECTION, SOLUTION INTRAMUSCULAR; INTRAVENOUS at 07:08

## 2024-08-13 RX ADMIN — PROPOFOL 150 MG: 10 INJECTION, EMULSION INTRAVENOUS at 07:08

## 2024-08-13 RX ADMIN — CEFAZOLIN 3 G: 330 INJECTION, POWDER, FOR SOLUTION INTRAMUSCULAR; INTRAVENOUS at 07:08

## 2024-08-13 RX ADMIN — ACETAMINOPHEN 650 MG: 325 TABLET ORAL at 11:08

## 2024-08-13 RX ADMIN — HEPARIN SODIUM 4000 UNITS: 1000 INJECTION, SOLUTION INTRAVENOUS; SUBCUTANEOUS at 08:08

## 2024-08-13 RX ADMIN — PROPOFOL 20 MG: 10 INJECTION, EMULSION INTRAVENOUS at 09:08

## 2024-08-13 RX ADMIN — SUGAMMADEX 200 MG: 100 INJECTION, SOLUTION INTRAVENOUS at 09:08

## 2024-08-13 RX ADMIN — Medication 10 MG: at 08:08

## 2024-08-13 RX ADMIN — DEXAMETHASONE SODIUM PHOSPHATE 8 MG: 4 INJECTION INTRA-ARTICULAR; INTRALESIONAL; INTRAMUSCULAR; INTRAVENOUS; SOFT TISSUE at 07:08

## 2024-08-13 RX ADMIN — PANTOPRAZOLE SODIUM 40 MG: 40 TABLET, DELAYED RELEASE ORAL at 10:08

## 2024-08-13 RX ADMIN — PROPOFOL 30 MG: 10 INJECTION, EMULSION INTRAVENOUS at 09:08

## 2024-08-13 RX ADMIN — ROCURONIUM BROMIDE 70 MG: 10 INJECTION INTRAVENOUS at 07:08

## 2024-08-13 RX ADMIN — SODIUM CHLORIDE, SODIUM GLUCONATE, SODIUM ACETATE, POTASSIUM CHLORIDE, MAGNESIUM CHLORIDE, SODIUM PHOSPHATE, DIBASIC, AND POTASSIUM PHOSPHATE: .53; .5; .37; .037; .03; .012; .00082 INJECTION, SOLUTION INTRAVENOUS at 07:08

## 2024-08-13 RX ADMIN — SODIUM CHLORIDE: 0.9 INJECTION, SOLUTION INTRAVENOUS at 06:08

## 2024-08-13 RX ADMIN — PROTAMINE SULFATE 15 MG: 10 INJECTION, SOLUTION INTRAVENOUS at 09:08

## 2024-08-13 RX ADMIN — FENTANYL CITRATE 25 MCG: 50 INJECTION, SOLUTION INTRAMUSCULAR; INTRAVENOUS at 09:08

## 2024-08-13 RX ADMIN — CEFAZOLIN 2 G: 2 INJECTION, POWDER, FOR SOLUTION INTRAMUSCULAR; INTRAVENOUS at 10:08

## 2024-08-13 RX ADMIN — ASPIRIN 81 MG: 81 TABLET, COATED ORAL at 10:08

## 2024-08-13 RX ADMIN — ROCURONIUM BROMIDE 30 MG: 10 INJECTION INTRAVENOUS at 08:08

## 2024-08-13 RX ADMIN — LIDOCAINE HYDROCHLORIDE 100 MG: 20 INJECTION, SOLUTION EPIDURAL; INFILTRATION; INTRACAUDAL at 07:08

## 2024-08-13 RX ADMIN — ONDANSETRON 4 MG: 2 INJECTION INTRAMUSCULAR; INTRAVENOUS at 09:08

## 2024-08-13 NOTE — OP NOTE
"OPERATIVE REPORT    Patient: Adrian Benton  Surgery Date: 08/13/2024  Pre-op Diagnosis:  Abdominal aortic aneurysm (AAA) without rupture, unspecified part [I71.40]; 5.5 cm AAA     Post-op Diagnosis:  Same     Procedure(s):  1) Percutaneous EVAR, Endurant EIIS, 28 x 14 x 103mm, R DIANE 16 x 20 x 124mm, L DIANE 16 x 20 x 124mm  2) Bilateral percutaneous access CFA, large bore and perclose closure     Surgeon: OTTO Vaz MD DFSVS FACS     Assistant: Jefferson Feliciano MD - Fellow     Anesthesia: General     Findings/Key Components:  Successful treatment of 5.5 cm AAA  Type IV endoleak in completion ( secs)  2+ PT pulses     EBL: < 50 ml     Implants:   Implant Name Type Inv. Item Serial No.  Lot No. LRB No. Used Action   GRAFT STENT ENDURANT 103X14-28 - [id number]   GRAFT STENT ENDURANT 103X14-28 [id number] [employer] [place]   N/A 1 Implanted   endurant stent           Left 1 Implanted   endurant     [id number]     Right 1 Implanted           Indications for Procedure:  Adrian Benton is a 63 y.o. male who presented with a 5.5 cm abdominal aortic aneurysms.  He was offered an endovascular repair of his aneurysm. All risks, benefits, and alternatives were discussed and the patient understood and wished to proceed and gave written consent.     Operative Details:   The patient was brought to the operating room and placed on the operating room table in supine position with both arms tucked.  General anesthesia was induced and arterial line was placed by anesthesia. A Arellano catheter was placed.  The bilateral groins were then prepped and draped in the usual sterile fashion.  Perioperative antibiotics were given.  A surgical time-out was performed verifying the patient's name, date of birth, nature of the procedure.    We began by accessing the bilateral common femoral arteries with Micropuncture technique. A 21GA needle was advanced under ultrasound guidance into the lumen of the common femoral artery. A 0.018" wire " was advanced in to the artery without difficulty or resistance. A 4F sheath was placed over the wire and an angiogram performed. There appeared to be no complications with access. The 4F sheath was upsized to 6F sheath.    Perclose sutures were placed at 10 o'clock, 12 o'clock, and  2 o'clock for the R 16Fr access, and 10 o'clock, and 2 o'clock at the L 12 Fr access. The sutures were labeled and secured under a moistened blue towel.    We then upsized our femoral access to 10F bilaterally. Using angled glide catheters, we exchanged our wires for Amplatz wires, and delivered these into the proximal descending aorta.    Based on preoperative imaging review, we knew the left renal was lowest. An aortogram was performed to confirm this.  Through the right access, we introduced a Medtronic Endurant EIIS 28 x 14 x 103mm device to just above the level of the lowest renal orifice. Based on the wire behavior we elected to deploy the contralateral gate to the anterior position to facilitate cannulation.     Through the left access, we introduced a pigtail catheter. We removed the wire and formed the catheter above the takeoff of the renal vessels.    Aortography was performed. We confirmed our anatomy and marked the takeoff of the lowest renal vessel. The endograft was positioned appropriately to fixate below the lowest renal vessel. The endograft was then deployed in standard fashion using fluoroscopy. After opposing the graft using two Z stents to the infra-renal aorta, the suprarenal fixation was released. The rest of the device was opened to the contralateral gate which opened well.    Through the left access, we withdrew the pigtail catheter and wire. We introduced a 035 stiff angled glidewire and glide catheter. The contralateral gate was cannulated. We confirmed intragraft position by spinning the re-formed pigtail catheter in the graft, and performing an aortogram.     A retrograde iliac angiogram was performed  through the left access. The pigtail catheter was used to measure the contralateral limb length. We removed the catheter and introduced a 16 x 20 x 124mm iliac limb over an amplatz wire. The iliac limb was deployed in standard fashion taking care to land just above the left hypogastric.  The device was removed and the left common femoral artery plugged with a 12 [place] of [place] sheath.    The rest of the main body device was then deployed.  The device was removed, and a 16 [place] sheath used to plug the right common femoral artery.  A retrograde iliac angiogram was performed, with the pigtail catheter used to stacy the distance to the right hypogastric. We introduced a 16 x 20 x 124mm iliac limb over an amplatz wire, and deployed the iliac limb in the standard fashion, taking care to land above the right hypogastric.    We then used a 32mm coda balloon to appose the graft to the native blood vessels, and ensure opposition between the endograft components, as well as the iliac vessels.     Completion angiography was then performed. This revealed patent flow through the SMA, renal vessels and hypogastric vessels. There was no evidence of type 1A, 1B, II, III endoleak.  There did appear to be some delayed filling of the sac which seemed to originate from the endograft itself.  A repeat a aortogram was performed with a pigtail catheter brought within the main body of the graft, which again did display the filling of the sac, confirming presence of a type 4 endoleak.  At this time, the patient's ACT was 235, so it was determined that the endoleak would resolve with the reversal of heparin.    We removed our large bore femoral artery access and closed the arteriotomies using the pre-placed perclose sutures.  Heparin was reversed with protamine.  No additional perclose devices were needed. After securing the devices, the arteriotomies were noted to be hemostatic. A U-stitch of monocryl was used to close the skin, Dermabond and a dry  sterile dressing were applied.    The patient was transferred to the PACU in good condition.    All sponge and instrument counts were correct x 2.    Dr. Vaz was present for the entire procedure.     Sunil Vaz MD DFS FACS   Vascular/Endovascular Surgery

## 2024-08-13 NOTE — ASSESSMENT & PLAN NOTE
"  Neuro/Psych:     - Sedation: **    - Pain:     - Scheduled Tylenol 1000mg Q8H   - PRN Oxycodone 5mg/10mg, Hydromorphone     - Psych: **             Cardiac:     - S/p percutaneous EVAR with Dr. Vaz on 8/13/24    - BP Goal: -160    - Pressors: **    - Anti-HTNs:   - Home meds: IMDUR 30mg qd, Toprol XL 50mg qd, niroglycerin 0.4mg Sublingual prn chest pain    - Rhythm: **    - Home Rosuvastatin 40mg QHS and Ezetimibe      Pulmonary:     - **    - Goal SpO2 >92%      Renal:    - Trend BUN/Cr     - Maintain Arellano, record strict Is/Os    No results for input(s): "BUN", "CREATININE" in the last 168 hours.      FEN / GI:     - Daily CMP, PRN K/Mag/Phos per protocol     - Replace electrolytes as needed    - Nutrition: CLD **    - GI ppx: **    - Bowel Regimen: Miralax      ID:     - Aebrile    - Abx: Complete perioperative cefazolin 2g Q8H x 5 doses    No results for input(s): "WBC" in the last 168 hours.      Heme/Onc:     - Hgb ** pre-operatively    - CBC daily    - ASA 81 **    - Anticoagulation: **    - DVT ppx:    No results for input(s): "HGB", "PLT", "APTT", "INR" in the last 168 hours.      Endocrine:     - BG goal 100-180    - HgbA1c: **      PPx:   Feeding: CLD  Analgesia/Sedation: **  Thromboembolic Prevention: **  HOB >30: Yes  Stress Ulcer: Yes - famotidine 20mg IV/PO** BID  Glucose Control: BG goal 100-180     Lines/Drains/Airway:   Art Line: **    Central Line: **   Arellano **      Dispo/Code Status/Palliative:     - Continue SICU Care    - Full Code    "

## 2024-08-13 NOTE — TRANSFER OF CARE
"Anesthesia Transfer of Care Note    Patient: Adrian Benton    Procedure(s) Performed: Procedure(s) (LRB):  REPAIR, ANEURYSM, AORTA, ENDOVASCULAR (N/A)    Patient location: PACU    Anesthesia Type: general    Transport from OR: Transported from OR on 6-10 L/min O2 by face mask with adequate spontaneous ventilation    Post pain: adequate analgesia    Post assessment: no apparent anesthetic complications    Post vital signs: stable    Level of consciousness: awake and alert    Nausea/Vomiting: no nausea/vomiting    Complications: none    Transfer of care protocol was followed      Last vitals: Visit Vitals  BP (!) 159/95   Pulse 62   Temp 36.4 °C (97.5 °F)   Resp 16   Ht 5' 7" (1.702 m)   Wt 88.5 kg (195 lb 1.7 oz)   SpO2 97%   BMI 30.56 kg/m²     "

## 2024-08-13 NOTE — BRIEF OP NOTE
Brief Operative Note  Date: 08/13/2024    Pre-op Diagnosis:  Abdominal aortic aneurysm (AAA) without rupture, unspecified part [I71.40]; 5.5 cm AAA    Post-op Diagnosis:  Same    Procedure(s):  1) Percutaneous EVAR, Endurant EIIS, 28 x 14 x 103mm, R DIANE 16 x 20 x 124mm, L DIANE 16 x 20 x 124mm  2) Bilateral percutaneous access CFA, large bore and perclose closure    Surgeon: OTTO BAILEY FACS    Assistant: Jefferson Feliciano MD - Fellow    Anesthesia: General    Findings/Key Components:  Successful treatment of 5.5 cm AAA  Type IV endoleak in completion ( secs)  2+ PT pulses    EBL: < 50 ml    Implants:   Implant Name Type Inv. Item Serial No.  Lot No. LRB No. Used Action   GRAFT STENT ENDURANT 103X14-28 - [id number]  GRAFT STENT ENDURANT 103X14-28 [id number] [employer] [place]  N/A 1 Implanted   endurant stent      Left 1 Implanted   endurant   [id number]   Right 1 Implanted       Specimens (From admission, onward)      None          I attest to being present for the procedure and performing the case.  MD KAMERON Lockhart FACS

## 2024-08-13 NOTE — ANESTHESIA POSTPROCEDURE EVALUATION
Anesthesia Post Evaluation    Patient: Adrian Benton    Procedure(s) Performed: Procedure(s) (LRB):  REPAIR, ANEURYSM, AORTA, ENDOVASCULAR (N/A)    Final Anesthesia Type: general      Patient location during evaluation: PACU  Patient participation: Yes- Able to Participate  Level of consciousness: awake and alert  Post-procedure vital signs: reviewed and stable  Pain management: adequate  Airway patency: patent    PONV status: None or treated.  Anesthetic complications: no      Cardiovascular status: hemodynamically stable  Respiratory status: spontaneous ventilation  Hydration status: euvolemic  Follow-up not needed.          Vitals Value Taken Time   /77 08/13/24 1516   Temp 36.7 °C (98.1 °F) 08/13/24 0945   Pulse 50 08/13/24 1524   Resp 7 08/13/24 1524   SpO2 94 % 08/13/24 1524   Vitals shown include unfiled device data.      Event Time   Out of Recovery 10:00:00         Pain/Luciano Score: Pain Rating Prior to Med Admin: 0 (8/13/2024 11:48 AM)  Luciano Score: 9 (8/13/2024 10:00 AM)

## 2024-08-13 NOTE — ANESTHESIA PROCEDURE NOTES
Arterial    Diagnosis: AAA    Patient location during procedure: done in OR  Timeout: 8/13/2024 7:10 AM  Procedure end time: 8/13/2024 7:13 AM    Staffing  Authorizing Provider: Hao Valdes MD  Performing Provider: Aayush Butler MD    Staffing  Performed by: Aayush Butler MD  Authorized by: Hao Valdes MD    Anesthesiologist was present at the time of the procedure.    Preanesthetic Checklist  Completed: patient identified, IV checked, site marked, risks and benefits discussed, surgical consent, monitors and equipment checked, pre-op evaluation, timeout performed and anesthesia consent givenArterial  Skin Prep: chlorhexidine gluconate  Local Infiltration: none  Orientation: right  Location: radial    Catheter Size: 20 G  Catheter placement by Ultrasound guidance. Heme positive aspiration all ports.   Vessel Caliber: patent  Needle advanced into vessel with real time Ultrasound guidance.Insertion Attempts: 1  Assessment  Dressing: secured with tape and tegaderm  Patient: Tolerated well

## 2024-08-13 NOTE — H&P
HPI:  Patient is a 63 y.o. male with a h/o active tobacco use, CAD s/p NSTEMI and PCI (July 2022), obesity (BMI 33) who is here today for f/u AAA - now 5.3cm  Previously seen by Dr Lyon in Dec 2022  Active - cuts on grass without angina or GRIGGS. Lost 12 lbs     Able to walk 1 flight of stairs without any angina or GRIGGS     + MI, NSTEMI with PCI July 2022  No stroke  Tobacco use: active - cut back to 1 cigar in AM, 1 in PM ; > 40 pack yrs  FHx for aneurysmal disease: ?     Darion Merino II, NP   Employment: Employed: works as  2-3x/wk       Current Medications      Current Outpatient Medications:     aspirin (ECOTRIN) 81 MG EC tablet, Take 81 mg by mouth once daily., Disp: , Rfl:     ezetimibe (ZETIA) 10 mg tablet, TAKE 1 TABLET(10 MG) BY MOUTH EVERY DAY, Disp: 90 tablet, Rfl: 4    metFORMIN (GLUCOPHAGE) 500 MG tablet, Take 1 tablet (500 mg total) by mouth 2 (two) times daily with meals., Disp: 180 tablet, Rfl: 3    metoprolol succinate (TOPROL-XL) 50 MG 24 hr tablet, Take 1 tablet (50 mg total) by mouth once daily., Disp: 90 tablet, Rfl: 3    omeprazole (PRILOSEC) 20 MG capsule, TAKE 1 CAPSULE(20 MG) BY MOUTH EVERY DAY, Disp: 90 capsule, Rfl: 3    rosuvastatin (CRESTOR) 40 MG Tab, Take 1 tablet (40 mg total) by mouth every evening., Disp: 90 tablet, Rfl: 3    vitamin D (VITAMIN D3) 1000 units Tab, Take 1,000 Units by mouth once daily., Disp: , Rfl:     clotrimazole (LOTRIMIN) 1 % cream, Apply to affected area 2 times daily (Patient not taking: Reported on 7/8/2024), Disp: 15 g, Rfl: 0    isosorbide mononitrate (IMDUR) 30 MG 24 hr tablet, Take 1 tablet (30 mg total) by mouth once daily. (Patient not taking: Reported on 7/8/2024), Disp: 90 tablet, Rfl: 3    nitroGLYCERIN (NITROSTAT) 0.4 MG SL tablet, Place 1 tablet (0.4 mg total) under the tongue every 5 (five) minutes as needed for Chest pain. (Patient not taking: Reported on 6/10/2024), Disp: 30 tablet, Rfl: 3        PHYSICAL EXAM:   Right Arm  BP - Sittin/74 (24 1226)  Left Arm BP - Sittin/67 (24 1226)  Pulse: 64                                  Neck:            No carotid bruit can be auscultated                           Abdomen:          Soft, nontender, nondistended, no masses or organomegaly                                                        No rebound tenderness noted; bowel sounds normal                                                        Pulsatile aortic mass is palpable.                                                        No groin adenopathy                       Extremities:           2+ femoral pulses bilaterally                                                        2+ popliteal pulses                                                        2+ PT > DP pedal pulses palpable x2                                                        No pedal edema; No ulcerations     LAB RESULTS:        Lab Results   Component Value Date     K 5.0 06/10/2024     K 4.1 2023     K 4.4 2023     CREATININE 0.9 06/10/2024     CREATININE 0.8 2023     CREATININE 0.8 2023            Lab Results   Component Value Date     WBC 8.82 06/10/2024     WBC 9.17 10/27/2023     WBC 9.46 2023     HCT 50.2 06/10/2024     HCT 51.1 10/27/2023     HCT 48.7 2023      06/10/2024      10/27/2023      2023            Lab Results   Component Value Date     HGBA1C 5.3 2024     HGBA1C 5.5 10/27/2023     HGBA1C 6.2 (H) 2023      IMAGING (I have independently reviewed and interpreted the following tests):  2024: CT A/P AAA is 5.5cm in sagittal. Slowly growing.      Prior:  2023, CT a.p: to my read, the AAA is 5.3cm  R DIANE 2.3cm  L DIANE 2.0cm     2023, CT a/p: 5.3cm AAA    May 2021, CTA: 4.5 cm AAA by my read   Bilateral common iliac artery aneurysms (18-19mm bilaterally)     Duplex 2021: 44mm     Duplex 2022 43 mm - maximal diameter erroneously reported as 50mm along  major axis     IMP/PLAN:     Plan  63 y.o. male with a  h/o active tobacco use, CAD s/p NSTEMI and PCI (July 2022), obesity (BMI 33) with a nearly 5.5cm AAA. Anatomy is suitable for future endovascular AAA repair, considering [employer] for suprarenal fixation given young age.      On the CT a/p, he ha a 1.6cm L adrenal mass: seen by endocrinology, likely benign adenoma.  Check CTA a/p  Plan for percutaneous EVAR Tue 8/13/24 in [facility] main campus OR 11

## 2024-08-13 NOTE — NURSING TRANSFER
Nursing Transfer Note      8/13/2024   2:30 PM    Pt transported to [id number] on bed by rn and pct.  Transported c iv pump, ivf infusing, transport monitor.  VSS, denies pain, n/v, sob.  Report given to receiving floor rn, family updated

## 2024-08-13 NOTE — ANESTHESIA PROCEDURE NOTES
Intubation    Date/Time: 8/13/2024 7:05 AM    Performed by: Aayush Butler MD  Authorized by: Hao Valdes MD    Intubation:     Induction:  Intravenous    Intubated:  Postinduction    Mask Ventilation:  Moderately difficult with oral airway (beard)    Attempts:  1    Attempted By:  Resident anesthesiologist    Method of Intubation:  Video laryngoscopy    Blade:  Eden 3    Laryngeal View Grade: Grade I - full view of cords      Difficult Airway Encountered?: No      Complications:  None    Airway Device:  Oral endotracheal tube    Airway Device Size:  7.5    Style/Cuff Inflation:  Cuffed (inflated to minimal occlusive pressure)    Tube secured:  22    Secured at:  The teeth    Placement Verified By:  Capnometry    Complicating Factors:  None    Findings Post-Intubation:  BS equal bilateral and atraumatic/condition of teeth unchanged

## 2024-08-13 NOTE — HPI
Adrian Benton is a 66M with a PMHx of active tobacco use (>40 pack years), CAD s/p NSTEMI and PCI (July 2022), obesity (BMI 33), and AAA - now 5.3cm . They present to the SICU s/p percutaneous EVAR with Dr. Vaz on 8/13/24. On admission, patient extubated in OR and in stable condition. Arterial access includes a R radial arterial line.     Preoperative vascular exam (7/8/24):  2+ femoral pulses bilaterally  2+ popliteal pulses  2+ PT > DP pedal pulses palpable x2  No pedal edema; No ulcerations     Intraoperatively, *** received *** of crystalloid, *** of 5% albumin, *** of cell saver, *** PRBCs, *** FFP, *** platelets, and *** cryoprecipitate. Urine output intraoperatively was recorded as ***cc.      Immediate post-operative plans include hemodynamic stabilization and weaning of cardiac and respiratory support. Plan to wean vasopressors and inotropes as tolerated, and closely monitor fluid status with strict Is/Os and continued fluid resuscitation as needed.

## 2024-08-14 VITALS
RESPIRATION RATE: 17 BRPM | SYSTOLIC BLOOD PRESSURE: 155 MMHG | OXYGEN SATURATION: 100 % | HEART RATE: 63 BPM | TEMPERATURE: 98 F | WEIGHT: 195.13 LBS | DIASTOLIC BLOOD PRESSURE: 84 MMHG | BODY MASS INDEX: 30.63 KG/M2 | HEIGHT: 67 IN

## 2024-08-14 LAB
ANION GAP SERPL CALC-SCNC: 10 MMOL/L (ref 8–16)
BASOPHILS # BLD AUTO: 0.02 K/UL (ref 0–0.2)
BASOPHILS NFR BLD: 0.1 % (ref 0–1.9)
BUN SERPL-MCNC: 9 MG/DL (ref 8–23)
CALCIUM SERPL-MCNC: 9.4 MG/DL (ref 8.7–10.5)
CHLORIDE SERPL-SCNC: 107 MMOL/L (ref 95–110)
CO2 SERPL-SCNC: 25 MMOL/L (ref 23–29)
CREAT SERPL-MCNC: 0.8 MG/DL (ref 0.5–1.4)
DIFFERENTIAL METHOD BLD: ABNORMAL
EOSINOPHIL # BLD AUTO: 0 K/UL (ref 0–0.5)
EOSINOPHIL NFR BLD: 0 % (ref 0–8)
ERYTHROCYTE [DISTWIDTH] IN BLOOD BY AUTOMATED COUNT: 14.6 % (ref 11.5–14.5)
EST. GFR  (NO RACE VARIABLE): >60 ML/MIN/1.73 M^2
GLUCOSE SERPL-MCNC: 119 MG/DL (ref 70–110)
HCT VFR BLD AUTO: 37.7 % (ref 40–54)
HGB BLD-MCNC: 12.5 G/DL (ref 14–18)
IMM GRANULOCYTES # BLD AUTO: 0.06 K/UL (ref 0–0.04)
IMM GRANULOCYTES NFR BLD AUTO: 0.4 % (ref 0–0.5)
LYMPHOCYTES # BLD AUTO: 1.6 K/UL (ref 1–4.8)
LYMPHOCYTES NFR BLD: 11.2 % (ref 18–48)
MCH RBC QN AUTO: 30 PG (ref 27–31)
MCHC RBC AUTO-ENTMCNC: 33.2 G/DL (ref 32–36)
MCV RBC AUTO: 91 FL (ref 82–98)
MONOCYTES # BLD AUTO: 1.1 K/UL (ref 0.3–1)
MONOCYTES NFR BLD: 7.5 % (ref 4–15)
NEUTROPHILS # BLD AUTO: 11.6 K/UL (ref 1.8–7.7)
NEUTROPHILS NFR BLD: 80.8 % (ref 38–73)
NRBC BLD-RTO: 0 /100 WBC
PLATELET # BLD AUTO: 160 K/UL (ref 150–450)
PMV BLD AUTO: 11.4 FL (ref 9.2–12.9)
POCT GLUCOSE: 112 MG/DL (ref 70–110)
POCT GLUCOSE: 136 MG/DL (ref 70–110)
POCT GLUCOSE: 151 MG/DL (ref 70–110)
POTASSIUM SERPL-SCNC: 4.1 MMOL/L (ref 3.5–5.1)
RBC # BLD AUTO: 4.16 M/UL (ref 4.6–6.2)
SODIUM SERPL-SCNC: 142 MMOL/L (ref 136–145)
WBC # BLD AUTO: 14.33 K/UL (ref 3.9–12.7)

## 2024-08-14 PROCEDURE — 85025 COMPLETE CBC W/AUTO DIFF WBC: CPT | Performed by: STUDENT IN AN ORGANIZED HEALTH CARE EDUCATION/TRAINING PROGRAM

## 2024-08-14 PROCEDURE — 25000003 PHARM REV CODE 250: Performed by: STUDENT IN AN ORGANIZED HEALTH CARE EDUCATION/TRAINING PROGRAM

## 2024-08-14 PROCEDURE — 36415 COLL VENOUS BLD VENIPUNCTURE: CPT | Performed by: STUDENT IN AN ORGANIZED HEALTH CARE EDUCATION/TRAINING PROGRAM

## 2024-08-14 PROCEDURE — S4991 NICOTINE PATCH NONLEGEND: HCPCS | Performed by: STUDENT IN AN ORGANIZED HEALTH CARE EDUCATION/TRAINING PROGRAM

## 2024-08-14 PROCEDURE — 63600175 PHARM REV CODE 636 W HCPCS: Performed by: STUDENT IN AN ORGANIZED HEALTH CARE EDUCATION/TRAINING PROGRAM

## 2024-08-14 PROCEDURE — 25000003 PHARM REV CODE 250: Performed by: SURGERY

## 2024-08-14 PROCEDURE — 80048 BASIC METABOLIC PNL TOTAL CA: CPT | Performed by: STUDENT IN AN ORGANIZED HEALTH CARE EDUCATION/TRAINING PROGRAM

## 2024-08-14 RX ORDER — HYDROCODONE BITARTRATE AND ACETAMINOPHEN 5; 325 MG/1; MG/1
1 TABLET ORAL EVERY 6 HOURS PRN
Qty: 10 TABLET | Refills: 0 | Status: SHIPPED | OUTPATIENT
Start: 2024-08-14

## 2024-08-14 RX ADMIN — SODIUM CHLORIDE: 9 INJECTION, SOLUTION INTRAVENOUS at 02:08

## 2024-08-14 RX ADMIN — HEPARIN SODIUM 5000 UNITS: 5000 INJECTION INTRAVENOUS; SUBCUTANEOUS at 01:08

## 2024-08-14 RX ADMIN — ISOSORBIDE MONONITRATE 30 MG: 30 TABLET, EXTENDED RELEASE ORAL at 08:08

## 2024-08-14 RX ADMIN — CHOLECALCIFEROL TAB 25 MCG (1000 UNIT) 1000 UNITS: 25 TAB at 08:08

## 2024-08-14 RX ADMIN — ACETAMINOPHEN 650 MG: 325 TABLET ORAL at 12:08

## 2024-08-14 RX ADMIN — ASPIRIN 81 MG: 81 TABLET, COATED ORAL at 08:08

## 2024-08-14 RX ADMIN — MUPIROCIN: 20 OINTMENT TOPICAL at 08:08

## 2024-08-14 RX ADMIN — NICOTINE 1 PATCH: 14 PATCH, EXTENDED RELEASE TRANSDERMAL at 08:08

## 2024-08-14 RX ADMIN — CEFAZOLIN 2 G: 2 INJECTION, POWDER, FOR SOLUTION INTRAMUSCULAR; INTRAVENOUS at 05:08

## 2024-08-14 RX ADMIN — HEPARIN SODIUM 5000 UNITS: 5000 INJECTION INTRAVENOUS; SUBCUTANEOUS at 05:08

## 2024-08-14 RX ADMIN — ATORVASTATIN CALCIUM 40 MG: 40 TABLET, FILM COATED ORAL at 08:08

## 2024-08-14 RX ADMIN — PANTOPRAZOLE SODIUM 40 MG: 40 TABLET, DELAYED RELEASE ORAL at 08:08

## 2024-08-14 RX ADMIN — METOPROLOL SUCCINATE 25 MG: 25 TABLET, EXTENDED RELEASE ORAL at 08:08

## 2024-08-14 NOTE — PLAN OF CARE
Patient AAOX3, independent at baseline. PCP correct on facesheet. Denies owning any DME. Spouse to provide transportation home.   08/14/24 4816   Discharge Assessment   Assessment Type Discharge Planning Assessment   Confirmed/corrected address, phone number and insurance Yes   Confirmed Demographics Correct on Facesheet   Source of Information patient   Communicated ISABEL with patient/caregiver Date not available/Unable to determine   People in Home spouse   Do you expect to return to your current living situation? Yes   Do you have help at home or someone to help you manage your care at home? Yes   Who are your caregiver(s) and their phone number(s)? Lis Aguilar (Spouse)  [phone] (Mobile)   Prior to hospitilization cognitive status: Alert/Oriented   Current cognitive status: Alert/Oriented   Walking or Climbing Stairs Difficulty no   Dressing/Bathing Difficulty no   Equipment Currently Used at Home none   Readmission within 30 days? No   Do you currently have service(s) that help you manage your care at home? No   Do you take prescription medications? Yes   Do you have prescription coverage? Yes   Do you have any problems affording any of your prescribed medications? No   Is the patient taking medications as prescribed? yes   Who is going to help you get home at discharge? Lis Aguilar (Spouse)  [phone] (Mobile)   How do you get to doctors appointments? car, drives self   Are you on dialysis? No   Do you take coumadin? No   Discharge Plan A Home with family   Discharge Plan B Home   DME Needed Upon Discharge  none   Discharge Plan discussed with: Spouse/sig other   Name(s) and Number(s) Lis Aguilar (Spouse)  [phone] (Mobile)   Transition of Care Barriers None   Physical Activity   On average, how many days per week do you engage in moderate to strenuous exercise (like a brisk walk)? 0 days   On average, how many minutes do you engage in exercise at this level? 0 min   Financial Resource Strain    How hard is it for you to pay for the very basics like food, housing, medical care, and heating? Somewhat   Housing Stability   In the last 12 months, was there a time when you were not able to pay the mortgage or rent on time? N   At any time in the past 12 months, were you homeless or living in a shelter (including now)? N   Transportation Needs   Has the lack of transportation kept you from medical appointments, meetings, work or from getting things needed for daily living? No   Food Insecurity   Within the past 12 months, you worried that your food would run out before you got the money to buy more. Never true   Within the past 12 months, the food you bought just didn't last and you didn't have money to get more. Never true   Stress   Do you feel stress - tense, restless, nervous, or anxious, or unable to sleep at night because your mind is troubled all the time - these days? Not at all   Social Isolation   How often do you feel lonely or isolated from those around you?  Never   Alcohol Use   Q1: How often do you have a drink containing alcohol? 2-4 pr month   Q2: How many drinks containing alcohol do you have on a typical day when you are drinking? 1 or 2   Q3: How often do you have six or more drinks on one occasion? Never   Utilities   In the past 12 months has the electric, gas, oil, or water company threatened to shut off services in your home? Yes   Health Literacy   How often do you need to have someone help you when you read instructions, pamphlets, or other written material from your doctor or pharmacy? Never     [facility] - Surgical Intensive Care  Initial Discharge Assessment       Primary Care Provider: Darion Merino II, NP    Admission Diagnosis: Abdominal aortic aneurysm (AAA) without rupture, unspecified part [I71.40]    Admission Date: 8/13/2024  Expected Discharge Date:     Transition of Care Barriers: (P) None    Payor: MEDICAID / Plan: [facility] COMMUNITY PLAN [facility] ([place] MEDICAID) /  Product Type: Managed Medicaid /     Extended Emergency Contact Information  Primary Emergency Contact: Lis Aguilar   [place]  Mobile Phone: [phone]  Relation: Spouse  Secondary Emergency Contact: Prema Aguilar   [place]  Mobile Phone: [phone]  Relation: Daughter    Discharge Plan A: (P) Home with family  Discharge Plan B: (P) Home      [id number] - [place], [place] - [address]  [address]  [place] [address]  Phone: [phone] Fax: [phone]    [id number] - [place], [place] - [address]  [address]  [place] [address]  Phone: [phone] Fax: [phone]      Initial Assessment (most recent)       Adult Discharge Assessment - 08/14/24 0954          Discharge Assessment    Assessment Type Discharge Planning Assessment (P)      Confirmed/corrected address, phone number and insurance Yes (P)      Confirmed Demographics Correct on Facesheet (P)      Source of Information patient (P)      Communicated ISABEL with patient/caregiver Date not available/Unable to determine (P)      People in Home spouse (P)      Do you expect to return to your current living situation? Yes (P)      Do you have help at home or someone to help you manage your care at home? Yes (P)      Who are your caregiver(s) and their phone number(s)? Lis Aguilar (Spouse)  [phone] (Mobile) (P)      Prior to hospitilization cognitive status: Alert/Oriented (P)      Current cognitive status: Alert/Oriented (P)      Walking or Climbing Stairs Difficulty no (P)      Dressing/Bathing Difficulty no (P)      Equipment Currently Used at Home none (P)      Readmission within 30 days? No (P)      Do you currently have service(s) that help you manage your care at home? No (P)      Do you take prescription medications? Yes (P)      Do you have prescription coverage? Yes (P)      Do you have any problems affording  any of your prescribed medications? No (P)      Is the patient taking medications as prescribed? yes (P)      Who is going to help you get home at discharge? Lis Aguilar (Spouse)  [phone] (Mobile) (P)      How do you get to doctors appointments? car, drives self (P)      Are you on dialysis? No (P)      Do you take coumadin? No (P)      Discharge Plan A Home with family (P)      Discharge Plan B Home (P)      DME Needed Upon Discharge  none (P)      Discharge Plan discussed with: Spouse/sig other (P)      Name(s) and Number(s) Lis Aguilar (Spouse)  [phone] (Mobile) (P)      Transition of Care Barriers None (P)         Physical Activity    On average, how many days per week do you engage in moderate to strenuous exercise (like a brisk walk)? 0 days (P)      On average, how many minutes do you engage in exercise at this level? 0 min (P)         Financial Resource Strain    How hard is it for you to pay for the very basics like food, housing, medical care, and heating? Somewhat hard (P)         Housing Stability    In the last 12 months, was there a time when you were not able to pay the mortgage or rent on time? No (P)      At any time in the past 12 months, were you homeless or living in a shelter (including now)? No (P)         Transportation Needs    Has the lack of transportation kept you from medical appointments, meetings, work or from getting things needed for daily living? No (P)         Food Insecurity    Within the past 12 months, you worried that your food would run out before you got the money to buy more. Never true (P)      Within the past 12 months, the food you bought just didn't last and you didn't have money to get more. Never true (P)         Stress    Do you feel stress - tense, restless, nervous, or anxious, or unable to sleep at night because your mind is troubled all the time - these days? Not at all (P)         Social Isolation    How often do you feel lonely or isolated from  those around you?  Never (P)         Alcohol Use    Q1: How often do you have a drink containing alcohol? 2-4 times a month (P)      Q2: How many drinks containing alcohol do you have on a typical day when you are drinking? 1 or 2 (P)      Q3: How often do you have six or more drinks on one occasion? Never (P)         Utilities    In the past 12 months has the electric, gas, oil, or water company threatened to shut off services in your home? Yes (P)         Health Literacy    How often do you need to have someone help you when you read instructions, pamphlets, or other written material from your doctor or pharmacy? Never (P)

## 2024-08-14 NOTE — PROGRESS NOTES
[facility] - Surgical Intensive Care  Vascular Surgery  Progress Note    Patient Name: Adrian Benton  MRN: [id number]  Admission Date: 8/13/2024  Primary Care Provider: Darion Merino II, NP    Subjective:     Interval History: POD 1 EVAR. Patient did well overnight. Reports minimal pain. Groin dressings taken down; without hematoma or swelling. Tolerating CLD.      Post-Op Info:  Procedure(s) (LRB):  REPAIR, ANEURYSM, AORTA, ENDOVASCULAR (N/A)   1 Day Post-Op     Medications:  Continuous Infusions:  Scheduled Meds:   acetaminophen  650 mg Oral Q6H    aspirin  81 mg Oral Daily    atorvastatin  40 mg Oral Daily    heparin (porcine)  5,000 Units Subcutaneous Q8H    isosorbide mononitrate  30 mg Oral Daily    metoprolol succinate  25 mg Oral Daily    mupirocin   Nasal BID    nicotine  1 patch Transdermal Daily    pantoprazole  40 mg Oral Daily    vitamin D  1,000 Units Oral Daily     PRN Meds:  Current Facility-Administered Medications:     0.9%  NaCl infusion (for blood administration), , Intravenous, Q24H PRN    dextrose 10%, 12.5 g, Intravenous, PRN    dextrose 10%, 12.5 g, Intravenous, PRN    dextrose 10%, 25 g, Intravenous, PRN    dextrose 10%, 25 g, Intravenous, PRN    glucagon (human recombinant), 1 mg, Intramuscular, PRN    glucose, 16 g, Oral, PRN    glucose, 24 g, Oral, PRN    HYDROcodone-acetaminophen, 1 tablet, Oral, Q4H PRN    HYDROcodone-acetaminophen, 1 tablet, Oral, Q4H PRN    insulin aspart U-100, 0-5 Units, Subcutaneous, QID (AC + HS) PRN    nitroGLYCERIN, 0.4 mg, Sublingual, Q5 Min PRN     Objective:     Vital Signs (Most Recent):  Temp: 97.9 °F (36.6 °C) (08/14/24 0800)  Pulse: (!) 57 (08/14/24 0800)  Resp: 20 (08/14/24 0800)  BP: (!) 157/84 (08/14/24 0800)  SpO2: 100 % (08/14/24 0800) Vital Signs (24h Range):  Temp:  [97.8 °F (36.6 °C)-98 °F (36.7 °C)] 97.9 °F (36.6 °C)  Pulse:  [50-74] 57  Resp:  [0-26] 20  SpO2:  [92 %-100 %] 100 %  BP: (110-160)/(63-97) 157/84  Arterial Line BP:  "()/() 145/66          Physical Exam  Cardiovascular:      Pulses: Normal pulses. Palp pedal pulses   Abdominal:      General: Abdomen is flat.      Palpations: Abdomen is soft.   Skin:     Capillary Refill: Capillary refill takes less than 2 seconds.      Comments: Groin sites clean and dry  No hematoma    Neurological:      General: No focal deficit present.      Mental Status: He is alert. Mental status is at baseline.          Significant Labs:  CBC:   Recent Labs   Lab 08/14/24  0245   WBC 14.33*   RBC 4.16*   HGB 12.5*   HCT 37.7*      MCV 91   MCH 30.0   MCHC 33.2     CMP: No results for input(s): "GLU", "CALCIUM", "ALBUMIN", "PROT", "NA", "K", "CO2", "CL", "BUN", "CREATININE", "ALKPHOS", "ALT", "AST", "BILITOT" in the last 48 hours.    Significant Diagnostics:  I have reviewed all pertinent imaging results/findings within the past 24 hours.  Assessment/Plan:     * Abdominal aortic aneurysm (AAA) without rupture  Adrian Benton is a 63 y.o. male who presented with a 5.5 cm abdominal aortic aneurysms. Now s/p EVAR with bilateral percutaneous access CFA    - Patient doing well post op   - home meds restarted   - Continue ASA and statin   - discontinue terrazas and art line   - advance diet   - discontinue IVF   - Possible discharge this afternoon; will arrange FUP in [place] MD  Vascular Surgery  [facility] - Surgical Intensive Care  "

## 2024-08-14 NOTE — PLAN OF CARE
[facility] Surgical Intensive Care  Discharge Final Note    Primary Care Provider: Darion Merino II, NP    Expected Discharge Date: 8/14/2024    Final Discharge Note (most recent)       Final Note - 08/14/24 1625          Final Note    Assessment Type Final Discharge Note     Anticipated Discharge Disposition Home or Self Care     Hospital Resources/Appts/Education Provided Appointments scheduled and added to AVS        Post-Acute Status    Post-Acute Authorization Other     Other Status No Post-Acute Service Needs                   The patient was discharged with no stated discharge needs.     Important Message from Medicare      Gabriel Owens LCSW  Case Management [facility]

## 2024-08-14 NOTE — DISCHARGE SUMMARY
[facility]  Vascular Surgery  Discharge Summary      Patient Name: Ronda Starr  MRN: [id number]  Admission Date: 8/13/2024  Hospital Length of Stay: 1 days  Discharge Date and Time:  08/14/2024 2:15 PM  Attending Physician: Sunil Vaz MD   Discharging Provider: Florencia Flood MD  Primary Care Provider: Darion Merino II, NP     HPI: Patient is a 63 y.o. male with a h/o active tobacco use, CAD s/p NSTEMI and PCI (July 2022), obesity (BMI 33) who is here today for f/u AAA - now 5.3cm  Previously seen by Dr Lyon in Dec 2022. Active - cuts on grass without angina or GRIGGS. Lost 12 lbs    Procedure(s) (LRB):  REPAIR, ANEURYSM, AORTA, ENDOVASCULAR (N/A)     Hospital Course:   Patient was admitted to the vascular surgery service. he was made NPO, given IVF, as well as pain and nausea control. Started on antibiotics. RONDA STARR 63 y.o.male underwent: Procedure(s) (LRB):  REPAIR, ANEURYSM, AORTA, ENDOVASCULAR (N/A). The patient tolerated the procedure well, was transferred to recovery post-op, and then transferred to the PCU for continuation of medical care. The patient's clinical condition progressively improved. Patient was HDS throughout admission. By the time of discharge, he was meeting all post op milestones, tolerating a diet without nausea or vomiting, pain was well controlled with oral medications, and he was ambulating without difficulty. Voiding appropriately. On POD 1 the patient was discharged to home. The patient will follow up in Dr. Vaz's [place] clinic in 4-6 weeks. Discussed POC and ED precautions with patient. Patient verbalized understanding and is agreeable to plan. All questions answered.    Please see hospital and op notes for further detail regarding patient's admission.    Patient's discharge was discussed with Dr. Vaz.             Indwelling Lines/Drains at time of discharge:   Lines/Drains/Airways       None                   Significant Diagnostic  "Studies: Labs: BMP: No results for input(s): "GLU", "NA", "K", "CL", "CO2", "BUN", "CREATININE", "CALCIUM", "MG" in the last 48 hours. and CBC   Recent Labs   Lab 08/14/24  0245   WBC 14.33*   HGB 12.5*   HCT 37.7*          Pending Diagnostic Studies:       Procedure Component Value Units Date/Time    Basic metabolic panel [[id number]] Collected: 08/14/24 1343    Order Status: Sent Lab Status: In process Updated: 08/14/24 135    Specimen: Blood             Final Active Diagnoses:    Diagnosis Date Noted POA    PRINCIPAL PROBLEM:  Abdominal aortic aneurysm (AAA) without rupture [I71.40] 07/29/2021 Yes    Coronary artery disease involving native coronary artery of native heart without angina pectoris [I25.10] 08/12/2022 Yes    Coronary artery calcification [I25.10, I25.84] 07/29/2021 Yes    Mixed hyperlipidemia [E78.2] 07/29/2021 Yes    Controlled type 2 diabetes mellitus without complication, without long-term current use of insulin [E11.9] 01/28/2020 Yes    Essential hypertension [I10] 01/24/2019 Yes    Tobacco abuse [Z72.0] 01/24/2019 Yes      Problems Resolved During this Admission:        Discharged Condition: good    Disposition: Home or Self Care    Follow Up:      Patient Instructions:      Diet Cardiac     Notify your health care provider if you experience any of the following:  increased confusion or weakness     Notify your health care provider if you experience any of the following:  persistent dizziness, light-headedness, or visual disturbances     Notify your health care provider if you experience any of the following:  worsening rash     Notify your health care provider if you experience any of the following:  severe persistent headache     Notify your health care provider if you experience any of the following:  difficulty breathing or increased cough     Notify your health care provider if you experience any of the following:  redness, tenderness, or signs of infection (pain, swelling, " redness, odor or green/yellow discharge around incision site)     Notify your health care provider if you experience any of the following:  severe uncontrolled pain     Notify your health care provider if you experience any of the following:  persistent nausea and vomiting or diarrhea     Notify your health care provider if you experience any of the following:  temperature >100.4     Reason for not Ordering Smoking Cessation Referral     Order Specific Question Answer Comments   Reason for not ordering: Patient refused already quit       Medications:  Reconciled Home Medications:      Medication List        START taking these medications      HYDROcodone-acetaminophen 5-325 mg per tablet  Commonly known as: NORCO  Take 1 tablet by mouth every 6 (six) hours as needed.            CONTINUE taking these medications      aspirin 81 MG EC tablet  Commonly known as: ECOTRIN  Take 81 mg by mouth once daily.     ezetimibe 10 mg tablet  Commonly known as: ZETIA  TAKE 1 TABLET(10 MG) BY MOUTH EVERY DAY     isosorbide mononitrate 30 MG 24 hr tablet  Commonly known as: IMDUR  Take 1 tablet (30 mg total) by mouth once daily.     metFORMIN 500 MG tablet  Commonly known as: GLUCOPHAGE  Take 1 tablet (500 mg total) by mouth 2 (two) times daily with meals.     metoprolol succinate 50 MG 24 hr tablet  Commonly known as: TOPROL-XL  TAKE 1 TABLET(50 MG) BY MOUTH EVERY DAY     nicotine 14 mg/24 hr  Commonly known as: NICODERM CQ  Place 1 patch onto the skin once daily.     nitroGLYCERIN 0.4 MG SL tablet  Commonly known as: NITROSTAT  Place 1 tablet (0.4 mg total) under the tongue every 5 (five) minutes as needed for Chest pain.     omeprazole 20 MG capsule  Commonly known as: PRILOSEC  TAKE 1 CAPSULE(20 MG) BY MOUTH EVERY DAY     rosuvastatin 40 MG Tab  Commonly known as: CRESTOR  Take 1 tablet (40 mg total) by mouth every evening.     vitamin D 1000 units Tab  Commonly known as: VITAMIN D3  Take 1,000 Units by mouth once daily.             ASK your doctor about these medications      clotrimazole 1 % cream  Commonly known as: LOTRIMIN  Apply to affected area 2 times daily              Florencia Flood MD           Patient was seen and examined on the date of discharge and determined to be suitable for discharge.  Total time spent preparing discharge services: 20 minutes.  Time was spent speaking with consultants and case management, reviewing records, and/or discussing the plan of care with patient/family.    Florencia Flood MD  General Surgery   PGY-3

## 2024-08-14 NOTE — ASSESSMENT & PLAN NOTE
Adrian Benton is a 63 y.o. male who presented with a 5.5 cm abdominal aortic aneurysms. Now s/p EVAR with bilateral percutaneous access CFA    - Patient doing well post op   - home meds restarted   - Continue ASA and statin   - discontinue terrazas and art line   - advance diet   - discontinue IVF   - Possible discharge this afternoon; will arrange FUP in [place]

## 2024-08-14 NOTE — PLAN OF CARE
Problem: Adult Inpatient Plan of Care  Goal: Plan of Care Review  Outcome: Met  Goal: Patient-Specific Goal (Individualized)  Outcome: Met  Goal: Absence of Hospital-Acquired Illness or Injury  Outcome: Met  Goal: Optimal Comfort and Wellbeing  Outcome: Met  Goal: Readiness for Transition of Care  Outcome: Met     Problem: Diabetes Comorbidity  Goal: Blood Glucose Level Within Targeted Range  Outcome: Met     Problem: Infection  Goal: Absence of Infection Signs and Symptoms  Outcome: Met     Problem: Wound  Goal: Optimal Coping  Outcome: Met  Goal: Optimal Functional Ability  Outcome: Met  Goal: Absence of Infection Signs and Symptoms  Outcome: Met  Goal: Improved Oral Intake  Outcome: Met  Goal: Optimal Pain Control and Function  Outcome: Met  Goal: Skin Health and Integrity  Outcome: Met  Goal: Optimal Wound Healing  Outcome: Met     Problem: Fall Injury Risk  Goal: Absence of Fall and Fall-Related Injury  Outcome: Met

## 2024-08-14 NOTE — PLAN OF CARE
08/14/24 1513   Post-Acute Status   Post-Acute Authorization Other   Other Status No Post-Acute Service Needs      The patient is being d/c today with no social service needs identified at this time.         Gabriel Owens LCSW  Case Management [facility]

## 2024-08-14 NOTE — SUBJECTIVE & OBJECTIVE
Medications:  Continuous Infusions:  Scheduled Meds:   acetaminophen  650 mg Oral Q6H    aspirin  81 mg Oral Daily    atorvastatin  40 mg Oral Daily    heparin (porcine)  5,000 Units Subcutaneous Q8H    isosorbide mononitrate  30 mg Oral Daily    metoprolol succinate  25 mg Oral Daily    mupirocin   Nasal BID    nicotine  1 patch Transdermal Daily    pantoprazole  40 mg Oral Daily    vitamin D  1,000 Units Oral Daily     PRN Meds:  Current Facility-Administered Medications:     0.9%  NaCl infusion (for blood administration), , Intravenous, Q24H PRN    dextrose 10%, 12.5 g, Intravenous, PRN    dextrose 10%, 12.5 g, Intravenous, PRN    dextrose 10%, 25 g, Intravenous, PRN    dextrose 10%, 25 g, Intravenous, PRN    glucagon (human recombinant), 1 mg, Intramuscular, PRN    glucose, 16 g, Oral, PRN    glucose, 24 g, Oral, PRN    HYDROcodone-acetaminophen, 1 tablet, Oral, Q4H PRN    HYDROcodone-acetaminophen, 1 tablet, Oral, Q4H PRN    insulin aspart U-100, 0-5 Units, Subcutaneous, QID (AC + HS) PRN    nitroGLYCERIN, 0.4 mg, Sublingual, Q5 Min PRN     Objective:     Vital Signs (Most Recent):  Temp: 97.9 °F (36.6 °C) (08/14/24 0800)  Pulse: (!) 57 (08/14/24 0800)  Resp: 20 (08/14/24 0800)  BP: (!) 157/84 (08/14/24 0800)  SpO2: 100 % (08/14/24 0800) Vital Signs (24h Range):  Temp:  [97.8 °F (36.6 °C)-98 °F (36.7 °C)] 97.9 °F (36.6 °C)  Pulse:  [50-74] 57  Resp:  [0-26] 20  SpO2:  [92 %-100 %] 100 %  BP: (110-160)/(63-97) 157/84  Arterial Line BP: ()/() 145/66          Physical Exam  Cardiovascular:      Pulses: Normal pulses.   Abdominal:      General: Abdomen is flat.      Palpations: Abdomen is soft.   Skin:     Capillary Refill: Capillary refill takes less than 2 seconds.      Comments: Groin sites clean and dry  No hematoma    Neurological:      General: No focal deficit present.      Mental Status: He is alert. Mental status is at baseline.          Significant Labs:  CBC:   Recent Labs   Lab  "08/14/24  0245   WBC 14.33*   RBC 4.16*   HGB 12.5*   HCT 37.7*      MCV 91   MCH 30.0   MCHC 33.2     CMP: No results for input(s): "GLU", "CALCIUM", "ALBUMIN", "PROT", "NA", "K", "CO2", "CL", "BUN", "CREATININE", "ALKPHOS", "ALT", "AST", "BILITOT" in the last 48 hours.    Significant Diagnostics:  I have reviewed all pertinent imaging results/findings within the past 24 hours.  "

## 2024-08-17 LAB
BLD PROD TYP BPU: NORMAL
BLD PROD TYP BPU: NORMAL
BLOOD UNIT EXPIRATION DATE: NORMAL
BLOOD UNIT EXPIRATION DATE: NORMAL
BLOOD UNIT TYPE CODE: 5100
BLOOD UNIT TYPE CODE: 5100
BLOOD UNIT TYPE: NORMAL
BLOOD UNIT TYPE: NORMAL
CODING SYSTEM: NORMAL
CODING SYSTEM: NORMAL
CROSSMATCH INTERPRETATION: NORMAL
CROSSMATCH INTERPRETATION: NORMAL
DISPENSE STATUS: NORMAL
DISPENSE STATUS: NORMAL
TRANS ERYTHROCYTES VOL PATIENT: NORMAL ML
TRANS ERYTHROCYTES VOL PATIENT: NORMAL ML

## 2024-12-04 DIAGNOSIS — E11.9 TYPE 2 DIABETES MELLITUS WITHOUT COMPLICATION: ICD-10-CM

## 2024-12-16 NOTE — Clinical Note
December 16, 2024      [facility]  [address]  [facility] [address]  Phone: [phone]  Fax: [phone]       Patient: Wang Lino   YOB: 2014  Date of Visit: 12/16/2024    To Whom It May Concern:    Ashok Lino  was at [facility] on 12/16/2024. The patient may return to work/school on 12/17/24 with no restrictions. If you have any questions or concerns, or if I can be of further assistance, please do not hesitate to contact me.    Sincerely,    Ana Diaz PA-C          The sheath was inserted into the left radial artery.

## 2025-06-18 DIAGNOSIS — E11.9 TYPE 2 DIABETES MELLITUS WITHOUT COMPLICATION: ICD-10-CM

## (undated) DEVICE — KIT GLIDESHEATH SLEND 6FR 10CM

## (undated) DEVICE — Device

## (undated) DEVICE — DRAPE ANGIO BRACH 38X44IN

## (undated) DEVICE — CATH IMPULSE 5FR PIGTAIL 125CM

## (undated) DEVICE — APPLICATOR CHLORAPREP ORN 26ML

## (undated) DEVICE — HEMOSTAT SURGICEL 4X8IN

## (undated) DEVICE — GUIDEWIRE RUNTHROUGH EF 180CM

## (undated) DEVICE — CONTRAST FLEXCIL INJECTION

## (undated) DEVICE — CATH EXPO FR4 6F

## (undated) DEVICE — SYR 50CC LL

## (undated) DEVICE — TOWEL OR DISP STRL BLUE 4/PK

## (undated) DEVICE — GUIDEWIRE STD .035X260CM ANG

## (undated) DEVICE — SUT SILK 2-0 SH 18IN BLACK

## (undated) DEVICE — CANNULA VESSEL

## (undated) DEVICE — KIT INTRO MICRO NIT VSI 4FR

## (undated) DEVICE — KIT LEFT HEART MANIFOLD CUSTOM

## (undated) DEVICE — SUT 2-0 VICRYL / SH (J417)

## (undated) DEVICE — CONTRAST VISIPAQUE 50ML

## (undated) DEVICE — CATH EMERGE MR 8 X 2.00

## (undated) DEVICE — GUIDE LAUNCHER 6FR EBU 3.5

## (undated) DEVICE — STAPLER SKIN PROXIMATE WIDE

## (undated) DEVICE — WIRE GUIDE SAFE-T-J .035 260CM

## (undated) DEVICE — WIRE MANDRIL 98/60CM

## (undated) DEVICE — PAD DEFIB CADENCE ADULT R2

## (undated) DEVICE — SUT PERCLOSE PROSTYLE MEDIATE

## (undated) DEVICE — SYR ONLY LUER LOCK 20CC

## (undated) DEVICE — GUIDEWIRE EMERALD .035IN 260CM

## (undated) DEVICE — STOPCOCK 1 WAY PLASTIC

## (undated) DEVICE — INTRODUCER VASC RADPQ 10FRX10C

## (undated) DEVICE — CATH GLIDE ANGLED 5FR 65CM

## (undated) DEVICE — ELECTRODE REM PLYHSV RETURN 9

## (undated) DEVICE — CATH NC QUANTUM APEX MR 2.5X12

## (undated) DEVICE — LOOP VESSEL BLUE MAXI

## (undated) DEVICE — COVER INSTR ELASTIC BAND 40X20

## (undated) DEVICE — INFLATOR ENCORE 26 BLLN INFL

## (undated) DEVICE — SUT PROLENE 5-0 24 C-1 BL

## (undated) DEVICE — PENCIL ROCKER SWITCH 10FT CORD

## (undated) DEVICE — GUIDEWIRE AMPLATZ STIFF 260X7

## (undated) DEVICE — DRAPE THREE-QTR REINF 53X77IN

## (undated) DEVICE — CATH EXPO FL4 6F

## (undated) DEVICE — BOWL STERILE LARGE 32OZ

## (undated) DEVICE — SUT MCRYL PLUS 4-0 PS2 27IN

## (undated) DEVICE — DRAPE INCISE IOBAN 2 23X33IN

## (undated) DEVICE — LOOP VESSEL BLUE MINI 2/CARD

## (undated) DEVICE — SOL NS 1000CC

## (undated) DEVICE — SYR MED RAD 150ML

## (undated) DEVICE — GUIDEWIRE SAFE T .035IN 180CM

## (undated) DEVICE — VALVE CONTROL COPILOT

## (undated) DEVICE — CATH EMERGE MR 8 X 2.25

## (undated) DEVICE — SHEATH INTRODUCER 16FX28CM

## (undated) DEVICE — CATH NC QUANTUM APEX MR 3X12

## (undated) DEVICE — SPONGE GAUZE 16PLY 4X4

## (undated) DEVICE — INTRODUCER VASC RADPQ 6FRX10CM

## (undated) DEVICE — GUIDEWIRE STF .035X260CM ANG

## (undated) DEVICE — CATH PIGTAIL

## (undated) DEVICE — DRAPE STERI INSTRUMENT 1018

## (undated) DEVICE — PAD RADI FEMORAL

## (undated) DEVICE — STENT RONYX20008UX RESOLUTE ONYX 2.00X08
Type: IMPLANTABLE DEVICE | Site: CORONARY | Status: NON-FUNCTIONAL
Brand: RESOLUTE ONYX™

## (undated) DEVICE — DECANTER FLUID TRNSF WHITE 9IN

## (undated) DEVICE — DRESSING ABSRBNT ISLAND 3.6X8

## (undated) DEVICE — GUIDE WIRE BMW 014 X190

## (undated) DEVICE — VISE RADIFOCUS MULTI TORQUE

## (undated) DEVICE — CATH EAGLE EYE ST .014X20X150

## (undated) DEVICE — CLIP MED TICALL

## (undated) DEVICE — SUT VICRYL 3-0 27 SH

## (undated) DEVICE — SYR 10CC LUER LOCK

## (undated) DEVICE — SHEATH INTRODUCER 12FX28CM

## (undated) DEVICE — SUT 3-0 12-18IN SILK

## (undated) DEVICE — CATH NC QUANTUM APEX MR 2.5X8

## (undated) DEVICE — SYR MARK 7 ARTERION 150ML

## (undated) DEVICE — PACK ECLIPSE UNIVERSAL STERILE

## (undated) DEVICE — KIT INTRODUCER W/GUIDEWIRE

## (undated) DEVICE — CATH NC QUANTUM APEX MR 2X12MM

## (undated) DEVICE — SUT 2-0 12-18IN SILK

## (undated) DEVICE — SUT 4-0 12-18IN SILK BLACK

## (undated) DEVICE — COVER PROBE US 5.5X58L NON LTX

## (undated) DEVICE — CONTRAST VISIPAQUE 150ML

## (undated) DEVICE — CATH EMERGE MR 8 X 1.50

## (undated) DEVICE — BLLN CAT CODA 32MM

## (undated) DEVICE — HEMOSTAT VASC BAND LONG 27CM

## (undated) DEVICE — TRAY CATH 1-LYR URIMTR 16FR

## (undated) DEVICE — CATH NC QUANTUM APEX MR 3X8